# Patient Record
Sex: FEMALE | Race: WHITE | NOT HISPANIC OR LATINO | Employment: UNEMPLOYED | URBAN - METROPOLITAN AREA
[De-identification: names, ages, dates, MRNs, and addresses within clinical notes are randomized per-mention and may not be internally consistent; named-entity substitution may affect disease eponyms.]

---

## 2017-03-15 ENCOUNTER — OFFICE VISIT (OUTPATIENT)
Dept: URGENT CARE | Facility: PHYSICIAN GROUP | Age: 57
End: 2017-03-15
Payer: COMMERCIAL

## 2017-03-15 VITALS
HEIGHT: 68 IN | DIASTOLIC BLOOD PRESSURE: 80 MMHG | TEMPERATURE: 97.2 F | BODY MASS INDEX: 23.49 KG/M2 | HEART RATE: 97 BPM | OXYGEN SATURATION: 97 % | SYSTOLIC BLOOD PRESSURE: 138 MMHG | WEIGHT: 155 LBS

## 2017-03-15 DIAGNOSIS — J01.00 ACUTE NON-RECURRENT MAXILLARY SINUSITIS: ICD-10-CM

## 2017-03-15 PROCEDURE — 99214 OFFICE O/P EST MOD 30 MIN: CPT | Performed by: FAMILY MEDICINE

## 2017-03-15 RX ORDER — DOXYCYCLINE HYCLATE 100 MG
100 TABLET ORAL 2 TIMES DAILY
Qty: 20 TAB | Refills: 0 | Status: SHIPPED | OUTPATIENT
Start: 2017-03-15 | End: 2017-03-25

## 2017-03-15 ASSESSMENT — ENCOUNTER SYMPTOMS
SINUS PRESSURE: 1
COUGH: 0
HEADACHES: 1

## 2017-03-15 NOTE — PATIENT INSTRUCTIONS

## 2017-03-15 NOTE — PROGRESS NOTES
"Subjective:      Trang Moses is a 56 y.o. female who presents with Sinus Problem            Sinus Problem  This is a new problem. The current episode started 1 to 4 weeks ago. The problem has been gradually worsening since onset. The pain is moderate. Associated symptoms include congestion, headaches and sinus pressure. Pertinent negatives include no coughing.       Review of Systems   HENT: Positive for congestion and sinus pressure.    Respiratory: Negative for cough.    Neurological: Positive for headaches.     Allergies   Allergen Reactions   • Morphine Anaphylaxis   • Penicillins Rash     .         Objective:     /80 mmHg  Pulse 97  Temp(Src) 36.2 °C (97.2 °F)  Ht 1.727 m (5' 8\")  Wt 70.308 kg (155 lb)  BMI 23.57 kg/m2  SpO2 97%     Physical Exam   Constitutional: She is oriented to person, place, and time. She appears well-developed and well-nourished. No distress.   HENT:   Head: Normocephalic and atraumatic.   Nose: Mucosal edema and rhinorrhea present. Right sinus exhibits maxillary sinus tenderness. Left sinus exhibits maxillary sinus tenderness.   Eyes: Conjunctivae and EOM are normal. Pupils are equal, round, and reactive to light.   Cardiovascular: Normal rate, regular rhythm, normal heart sounds and intact distal pulses.    No murmur heard.  Pulmonary/Chest: Effort normal and breath sounds normal. No respiratory distress.   Abdominal: Soft. Bowel sounds are normal. She exhibits no distension. There is no tenderness.   Musculoskeletal: Normal range of motion.   Neurological: She is alert and oriented to person, place, and time. She has normal reflexes. No sensory deficit.   Skin: Skin is warm and dry.   Psychiatric: She has a normal mood and affect. Her behavior is normal.               Assessment/Plan:     1. Acute non-recurrent maxillary sinusitis  Differential diagnosis, natural history, supportive care, and indications for immediate follow-up discussed.   - doxycycline (VIBRAMYCIN) 100 " MG Tab; Take 1 Tab by mouth 2 times a day for 10 days.  Dispense: 20 Tab; Refill: 0

## 2017-04-14 ENCOUNTER — HOSPITAL ENCOUNTER (OUTPATIENT)
Dept: LAB | Facility: MEDICAL CENTER | Age: 57
End: 2017-04-14
Attending: FAMILY MEDICINE
Payer: COMMERCIAL

## 2017-04-14 DIAGNOSIS — E78.00 PURE HYPERCHOLESTEROLEMIA: ICD-10-CM

## 2017-04-14 DIAGNOSIS — I10 ESSENTIAL HYPERTENSION: ICD-10-CM

## 2017-04-14 LAB
25(OH)D3 SERPL-MCNC: 6 NG/ML (ref 30–100)
ALBUMIN SERPL BCP-MCNC: 4.3 G/DL (ref 3.2–4.9)
ALBUMIN/GLOB SERPL: 1.1 G/DL
ALP SERPL-CCNC: 75 U/L (ref 30–99)
ALT SERPL-CCNC: 85 U/L (ref 2–50)
ANION GAP SERPL CALC-SCNC: 7 MMOL/L (ref 0–11.9)
AST SERPL-CCNC: 55 U/L (ref 12–45)
BILIRUB SERPL-MCNC: 1.2 MG/DL (ref 0.1–1.5)
BUN SERPL-MCNC: 15 MG/DL (ref 8–22)
CALCIUM SERPL-MCNC: 9.8 MG/DL (ref 8.5–10.5)
CHLORIDE SERPL-SCNC: 104 MMOL/L (ref 96–112)
CHOLEST SERPL-MCNC: 290 MG/DL (ref 100–199)
CO2 SERPL-SCNC: 27 MMOL/L (ref 20–33)
CREAT SERPL-MCNC: 0.61 MG/DL (ref 0.5–1.4)
CREAT UR-MCNC: 240.5 MG/DL
GFR SERPL CREATININE-BSD FRML MDRD: >60 ML/MIN/1.73 M 2
GLOBULIN SER CALC-MCNC: 3.9 G/DL (ref 1.9–3.5)
GLUCOSE SERPL-MCNC: 88 MG/DL (ref 65–99)
HDLC SERPL-MCNC: 92 MG/DL
LDLC SERPL CALC-MCNC: 174 MG/DL
MICROALBUMIN UR-MCNC: 1.6 MG/DL
MICROALBUMIN/CREAT UR: 7 MG/G (ref 0–30)
POTASSIUM SERPL-SCNC: 3.9 MMOL/L (ref 3.6–5.5)
PROT SERPL-MCNC: 8.2 G/DL (ref 6–8.2)
SODIUM SERPL-SCNC: 138 MMOL/L (ref 135–145)
TRIGL SERPL-MCNC: 121 MG/DL (ref 0–149)

## 2017-04-14 PROCEDURE — 36415 COLL VENOUS BLD VENIPUNCTURE: CPT

## 2017-04-14 PROCEDURE — 80053 COMPREHEN METABOLIC PANEL: CPT

## 2017-04-14 PROCEDURE — 80061 LIPID PANEL: CPT

## 2017-04-14 PROCEDURE — 82043 UR ALBUMIN QUANTITATIVE: CPT

## 2017-04-14 PROCEDURE — 82306 VITAMIN D 25 HYDROXY: CPT

## 2017-04-14 PROCEDURE — 82570 ASSAY OF URINE CREATININE: CPT

## 2017-04-21 ENCOUNTER — OFFICE VISIT (OUTPATIENT)
Dept: MEDICAL GROUP | Facility: PHYSICIAN GROUP | Age: 57
End: 2017-04-21
Payer: COMMERCIAL

## 2017-04-21 VITALS
SYSTOLIC BLOOD PRESSURE: 132 MMHG | HEIGHT: 68 IN | DIASTOLIC BLOOD PRESSURE: 90 MMHG | HEART RATE: 64 BPM | WEIGHT: 155 LBS | BODY MASS INDEX: 23.49 KG/M2 | TEMPERATURE: 97.5 F | OXYGEN SATURATION: 98 %

## 2017-04-21 DIAGNOSIS — R79.89 LOW SERUM VITAMIN D: ICD-10-CM

## 2017-04-21 DIAGNOSIS — E78.00 PURE HYPERCHOLESTEROLEMIA: ICD-10-CM

## 2017-04-21 DIAGNOSIS — I10 ESSENTIAL HYPERTENSION: ICD-10-CM

## 2017-04-21 PROCEDURE — 99214 OFFICE O/P EST MOD 30 MIN: CPT | Performed by: FAMILY MEDICINE

## 2017-04-21 NOTE — PROGRESS NOTES
"Subjective:   Trang Moses is a 56 y.o. female here today for HTN; lipids; lab review    Low serum vitamin D  Ongoing issue. Patient had been taking supplemental vitamin D over-the-counter. Recent blood work shows that her vitamin D level has continued to decrease down to 6.    Hyperlipidemia  Ongoing issue. Patient reports that she is working diligently to choose healthy lifestyle including healthy eating and regular daily exercise. Chart review shows that her total cholesterol and LDL cholesterol has not changed since last checked but have improved significantly in the last year.    Essential hypertension  Ongoing issue. Patient reports 100% compliance with lisinopril 20 mg daily. Patient denies any cough, headache, dizziness, chest pain. Chart review shows a blood pressure heart rate both are in the normal range. I reviewed recent blood work shows normal kidney function.         Current medicines (including changes today)  Current Outpatient Prescriptions   Medication Sig Dispense Refill   • Cholecalciferol 88236 UNIT Cap Take 1 Cap by mouth every 7 days. 12 Cap 3   • lisinopril (PRINIVIL) 20 MG Tab TAKE 1 TAB BY MOUTH EVERY DAY. 30 Tab 11   • lisinopril (PRINIVIL) 10 MG Tab Take 1.5 Tabs by mouth every day. 45 Tab 5   • aspirin (ASA) 325 MG Tab Take 325 mg by mouth every 6 hours as needed.     • therapeutic multivitamin-minerals (THERAGRAN-M) Tab Take 1 Tab by mouth every day.       No current facility-administered medications for this visit.     She  has a past medical history of MS (multiple sclerosis) (CMS-[facility]) and Hypertension.    ROS   No chest pain, no shortness of breath, no abdominal pain       Objective:     Blood pressure 132/90, pulse 64, temperature 36.4 °C (97.5 °F), height 1.727 m (5' 8\"), weight 70.308 kg (155 lb), SpO2 98 %. Body mass index is 23.57 kg/(m^2).   Physical Exam:  Alert, oriented in no acute distress.  Eye contact is good, speech goal directed, affect calm  HEENT: conjunctiva " non-injected, sclera non-icteric.  Pinna normal. TM pearly gray.   Oral mucous membranes pink and moist with no lesions.  Neck No adenopathy or masses in the neck or supraclavicular regions.  Lungs: clear to auscultation bilaterally with good excursion.  CV: regular rate and rhythm.  Abdomen: soft, nontender, No CVAT  Ext: no edema, color normal, vascularity normal, temperature normal  Neuro: CN 2-12 grossly intact  Psych: normal affect and mood      Assessment and Plan:   The following treatment plan was discussed     1. Pure hypercholesterolemia  LIPID PROFILE    Improved. Continue healthy lifestyle; recheck labs in one year   2. Low serum vitamin D  VITAMIN D,25 HYDROXY    Uncontrolled. Prescription strength vitamin D 50,000 international units once a week; recheck labs in one year   3. Essential hypertension  COMP METABOLIC PANEL    Stable. Continue current medications; recheck labs in one year       Followup: Return in about 1 year (around 4/21/2018) for HTN/lipids/labs, Short.

## 2017-04-21 NOTE — ASSESSMENT & PLAN NOTE
Ongoing issue. Patient reports that she is working diligently to choose healthy lifestyle including healthy eating and regular daily exercise. Chart review shows that her total cholesterol and LDL cholesterol has not changed since last checked but have improved significantly in the last year.

## 2017-04-21 NOTE — ASSESSMENT & PLAN NOTE
Ongoing issue. Patient reports 100% compliance with lisinopril 20 mg daily. Patient denies any cough, headache, dizziness, chest pain. Chart review shows a blood pressure heart rate both are in the normal range. I reviewed recent blood work shows normal kidney function.

## 2017-04-21 NOTE — MR AVS SNAPSHOT
"Trang Moses   2017 10:40 AM   Office Visit   MRN: [id number]    Department:  [facility]   Dept Phone:  [phone]    Description:  Female : 1960   Provider:  Alyse Lara D.O.           Reason for Visit     Follow-Up htn, labs      Allergies as of 2017     Allergen Noted Reactions    Doxycycline 2017   Rash    recent    Morphine 2016   Anaphylaxis    Penicillins 2016   Rash    .      You were diagnosed with     Pure hypercholesterolemia   [272.0.ICD-9-CM]       Low serum vitamin D   [8220927]       Essential hypertension   [3807587]         Vital Signs     Blood Pressure Pulse Temperature Height Weight Body Mass Index    132/90 mmHg 64 36.4 °C (97.5 °F) 1.727 m (5' 8\") 70.308 kg (155 lb) 23.57 kg/m2    Oxygen Saturation Smoking Status                98% Never Smoker           Basic Information     Date Of Birth Sex Race Ethnicity Preferred Language    1960 Female White Non- English      Your appointments     May 16, 2017  1:20 PM   MA SCRN10 with RB MG 2   [facility] ([address])    [address] [place] [address] [address]  [facility] [id number] [phone]           No deodorant, powder, perfume or lotion under the arm or breast area.            2018 10:40 AM   Established Patient with Alyse Lara D.O.   [facility] ([address] [id number] [phone]           You will be receiving a confirmation call a few days before your appointment from our automated call confirmation system.              Problem List              ICD-10-CM Priority Class Noted - Resolved    MS (multiple sclerosis) (CMS-HCC) G35   3/31/2016 - Present    Essential hypertension I10   3/31/2016 - Present    Hyperlipidemia E78.5   2016 - Present    Low serum vitamin D R79.89   2017 - Present      Health Maintenance        Date Due Completion Dates    IMM DTaP/Tdap/Td Vaccine (1 - Tdap) 1980 ---   "    PAP SMEAR 8/31/1982 ---    MAMMOGRAM 4/26/2017 4/26/2016    COLONOSCOPY 3/10/2021 3/10/2011 (Done)    Override on 3/10/2011: Done            Current Immunizations     Influenza Vaccine Quad Inj (Preserved) 10/21/2016      Below and/or attached are the medications your provider expects you to take. Review all of your home medications and newly ordered medications with your provider and/or pharmacist. Follow medication instructions as directed by your provider and/or pharmacist. Please keep your medication list with you and share with your provider. Update the information when medications are discontinued, doses are changed, or new medications (including over-the-counter products) are added; and carry medication information at all times in the event of emergency situations     Allergies:  DOXYCYCLINE - Rash     MORPHINE - Anaphylaxis     PENICILLINS - Rash               Medications  Valid as of: April 21, 2017 - 10:54 AM    Generic Name Brand Name Tablet Size Instructions for use    Aspirin (Tab)  MG Take 325 mg by mouth every 6 hours as needed.        Cholecalciferol (Cap) Cholecalciferol 69778 UNIT Take 1 Cap by mouth every 7 days.        Lisinopril (Tab) PRINIVIL 10 MG Take 1.5 Tabs by mouth every day.        Lisinopril (Tab) PRINIVIL 20 MG TAKE 1 TAB BY MOUTH EVERY DAY.        Multiple Vitamins-Minerals (Tab) THERAGRAN-M  Take 1 Tab by mouth every day.        .                 Medicines prescribed today were sent to:     [facility]/PHARMACY #[id number] [facility] [place] - [address] [address]    Phone: [phone] Fax: [phone]    Open 24 Hours?: No      Medication refill instructions:       If your prescription bottle indicates you have medication refills left, it is not necessary to call your provider’s office. Please contact your pharmacy and they will refill your medication.    If your prescription bottle indicates you do not have any refills left, you may request refills at any time  through one of the following ways: The online [employer] system (except Urgent Care), by calling your provider’s office, or by asking your pharmacy to contact your provider’s office with a refill request. Medication refills are processed only during regular business hours and may not be available until the next business day. Your provider may request additional information or to have a follow-up visit with you prior to refilling your medication.   *Please Note: Medication refills are assigned a new Rx number when refilled electronically. Your pharmacy may indicate that no refills were authorized even though a new prescription for the same medication is available at the pharmacy. Please request the medicine by name with the pharmacy before contacting your provider for a refill.        Your To Do List     Future Labs/Procedures Complete By Expires    COMP METABOLIC PANEL  12/1/2017 4/22/2018    LIPID PROFILE  12/1/2017 4/22/2018    VITAMIN D,25 HYDROXY  12/1/2017 4/22/2018         [employer] Access Code: Activation code not generated  Current [employer] Status: Active

## 2017-04-21 NOTE — ASSESSMENT & PLAN NOTE
Ongoing issue. Patient had been taking supplemental vitamin D over-the-counter. Recent blood work shows that her vitamin D level has continued to decrease down to 6.

## 2017-05-16 ENCOUNTER — HOSPITAL ENCOUNTER (OUTPATIENT)
Dept: RADIOLOGY | Facility: MEDICAL CENTER | Age: 57
End: 2017-05-16
Attending: FAMILY MEDICINE
Payer: COMMERCIAL

## 2017-05-16 DIAGNOSIS — Z12.31 VISIT FOR SCREENING MAMMOGRAM: ICD-10-CM

## 2017-05-16 PROCEDURE — 77063 BREAST TOMOSYNTHESIS BI: CPT

## 2017-07-18 ENCOUNTER — OFFICE VISIT (OUTPATIENT)
Dept: URGENT CARE | Facility: PHYSICIAN GROUP | Age: 57
End: 2017-07-18
Payer: COMMERCIAL

## 2017-07-18 ENCOUNTER — HOSPITAL ENCOUNTER (OUTPATIENT)
Dept: RADIOLOGY | Facility: MEDICAL CENTER | Age: 57
End: 2017-07-18
Attending: NURSE PRACTITIONER
Payer: COMMERCIAL

## 2017-07-18 VITALS
BODY MASS INDEX: 24.25 KG/M2 | DIASTOLIC BLOOD PRESSURE: 84 MMHG | TEMPERATURE: 98.4 F | WEIGHT: 160 LBS | SYSTOLIC BLOOD PRESSURE: 122 MMHG | HEIGHT: 68 IN | HEART RATE: 81 BPM | OXYGEN SATURATION: 96 %

## 2017-07-18 DIAGNOSIS — M79.644 FINGER PAIN, RIGHT: ICD-10-CM

## 2017-07-18 DIAGNOSIS — S62.656A CLOSED NONDISPLACED FRACTURE OF MIDDLE PHALANX OF RIGHT LITTLE FINGER, INITIAL ENCOUNTER: ICD-10-CM

## 2017-07-18 PROCEDURE — 73140 X-RAY EXAM OF FINGER(S): CPT | Mod: RT

## 2017-07-18 PROCEDURE — 99214 OFFICE O/P EST MOD 30 MIN: CPT | Performed by: NURSE PRACTITIONER

## 2017-07-18 RX ORDER — HYDROCODONE BITARTRATE AND ACETAMINOPHEN 5; 325 MG/1; MG/1
1 TABLET ORAL EVERY 4 HOURS PRN
Qty: 15 TAB | Refills: 0 | Status: SHIPPED | OUTPATIENT
Start: 2017-07-18 | End: 2018-05-25

## 2017-07-18 NOTE — MR AVS SNAPSHOT
"Trang Moses   2017 1:05 PM   Office Visit   MRN: [id number]    Department:  [facility]   Dept Phone:  [phone]    Description:  Female : 1960   Provider:  DONOVAN Clemons           Reason for Visit     Finger Injury right hand 5th finger injury       Allergies as of 2017     Allergen Noted Reactions    Doxycycline 2017   Rash    recent    Morphine 2016   Anaphylaxis    Penicillins 2016   Rash    .      You were diagnosed with     Finger pain, right   [[id number]]         Vital Signs     Blood Pressure Pulse Temperature Height Weight Body Mass Index    122/84 mmHg 81 36.9 °C (98.4 °F) 1.727 m (5' 8\") 72.576 kg (160 lb) 24.33 kg/m2    Oxygen Saturation Smoking Status                96% Never Smoker           Basic Information     Date Of Birth Sex Race Ethnicity Preferred Language    1960 Female White Non- English      Your appointments     2018 10:40 AM   Established Patient with Alyse Lara D.O.   [facility] ([place])    [address] [place] [id number] [phone]           You will be receiving a confirmation call a few days before your appointment from our automated call confirmation system.              Problem List              ICD-10-CM Priority Class Noted - Resolved    MS (multiple sclerosis) (CMS-HCC) G35   3/31/2016 - Present    Essential hypertension I10   3/31/2016 - Present    Hyperlipidemia E78.5   2016 - Present    Low serum vitamin D R79.89   2017 - Present      Health Maintenance        Date Due Completion Dates    IMM DTaP/Tdap/Td Vaccine (1 - Tdap) 1980 ---    PAP SMEAR 1982 ---    IMM INFLUENZA (1) 2017 10/21/2016    MAMMOGRAM 2018, 2016    COLONOSCOPY 3/10/2021 3/10/2011 (Done)    Override on 3/10/2011: Done            Current Immunizations     Influenza Vaccine Quad Inj (Preserved) 10/21/2016      Below and/or attached are the medications your " provider expects you to take. Review all of your home medications and newly ordered medications with your provider and/or pharmacist. Follow medication instructions as directed by your provider and/or pharmacist. Please keep your medication list with you and share with your provider. Update the information when medications are discontinued, doses are changed, or new medications (including over-the-counter products) are added; and carry medication information at all times in the event of emergency situations     Allergies:  DOXYCYCLINE - Rash     MORPHINE - Anaphylaxis     PENICILLINS - Rash               Medications  Valid as of: July 18, 2017 -  2:17 PM    Generic Name Brand Name Tablet Size Instructions for use    Aspirin (Tab)  MG Take 325 mg by mouth every 6 hours as needed.        Cholecalciferol (Cap) Cholecalciferol 97482 UNIT Take 1 Cap by mouth every 7 days.        Lisinopril (Tab) PRINIVIL 10 MG Take 1.5 Tabs by mouth every day.        Lisinopril (Tab) PRINIVIL 20 MG TAKE 1 TAB BY MOUTH EVERY DAY.        Multiple Vitamins-Minerals (Tab) THERAGRAN-M  Take 1 Tab by mouth every day.        .                 Medicines prescribed today were sent to:     [facility]/PHARMACY #[id number] - [place], [place] - [address] [id number] [facility] [address]    Phone: [phone] Fax: [phone]    Open 24 Hours?: No      Medication refill instructions:       If your prescription bottle indicates you have medication refills left, it is not necessary to call your provider’s office. Please contact your pharmacy and they will refill your medication.    If your prescription bottle indicates you do not have any refills left, you may request refills at any time through one of the following ways: The online [employer] system (except Urgent Care), by calling your provider’s office, or by asking your pharmacy to contact your provider’s office with a refill request. Medication refills are processed only during regular business hours  and may not be available until the next business day. Your provider may request additional information or to have a follow-up visit with you prior to refilling your medication.   *Please Note: Medication refills are assigned a new Rx number when refilled electronically. Your pharmacy may indicate that no refills were authorized even though a new prescription for the same medication is available at the pharmacy. Please request the medicine by name with the pharmacy before contacting your provider for a refill.        Your To Do List     Future Labs/Procedures Complete By Expires    DX-FINGER(S) 2+ RIGHT  As directed 7/18/2018         [employer] Access Code: Activation code not generated  Current [employer] Status: Active

## 2017-07-18 NOTE — PROGRESS NOTES
"Subjective:      Trang Moses is a 56 y.o. female who presents with Finger Injury            Hand Injury  This is a new problem. Episode onset: A screen door slammed into her right hand, little finger about 6 days ago. She also has a subsequent healing cut to the same finger but she noticed last night the pain in her finger was becoming worse. She reports on and off swelling since injury. The problem occurs constantly. The problem has been gradually worsening. Associated symptoms comments: Reports that at the end of the day her finger is more swollen from using it. It throbs at night before she goes to bed. She denies any redness or drainage from the finger. Exacerbated by: using right hand doing daily activities. She has tried NSAIDs and ice for the symptoms. The treatment provided no relief.       Review of Systems   Musculoskeletal: Positive for joint pain (right little finger).   All other systems reviewed and are negative.    Past Medical History   Diagnosis Date   • MS (multiple sclerosis) (CMS-HCC)    • Hypertension       Past Surgical History   Procedure Laterality Date   • Cholecystectomy     • Breast reconstruction        Social History     Social History   • Marital Status:      Spouse Name: N/A   • Number of Children: N/A   • Years of Education: N/A     Occupational History   • Not on file.     Social History Main Topics   • Smoking status: Never Smoker    • Smokeless tobacco: Never Used   • Alcohol Use: 0.0 oz/week     0 Standard drinks or equivalent per week      Comment: once a week   • Drug Use: No   • Sexual Activity: Yes     Other Topics Concern   • Not on file     Social History Narrative          Objective:     /84 mmHg  Pulse 81  Temp(Src) 36.9 °C (98.4 °F)  Ht 1.727 m (5' 8\")  Wt 72.576 kg (160 lb)  BMI 24.33 kg/m2  SpO2 96%     Physical Exam   Constitutional: She is oriented to person, place, and time. Vital signs are normal. She appears well-developed and well-nourished. "   HENT:   Head: Normocephalic and atraumatic.   Eyes: EOM are normal. Pupils are equal, round, and reactive to light.   Neck: Normal range of motion.   Cardiovascular: Normal rate and regular rhythm.    Pulmonary/Chest: Effort normal.   Musculoskeletal: Normal range of motion.        Right hand: She exhibits tenderness and swelling.        Hands:  Neurological: She is alert and oriented to person, place, and time.   Skin: Skin is warm and dry.   Psychiatric: She has a normal mood and affect. Her speech is normal and behavior is normal. Thought content normal.   Vitals reviewed.            7/18/2017 1:53 PM    HISTORY/REASON FOR EXAM:  Pain/Deformity Following Trauma.  .    TECHNIQUE/EXAM DESCRIPTION AND NUMBER OF VIEWS:  3 views of the RIGHT fingers.    COMPARISON: None    FINDINGS:  The alignment and mineralization are normal. No focal areas of soft tissue swelling or fracture lines are appreciated. No significant arthritic changes are identified.         Impression        No fracture or dislocation appreciated.     After review of the Xray by my self and Dr. Arjun Santillan, we concluded there appears to be a greenstick fracture on the distal portion of the middle phalanx right, fifth digit. Spoke with Dr. Simmons regarding read and she states she does not appreciate a fracture. Given the HPI and physical exam findings, I will treat the patient as a fracture. Discussed this with the patient and she V/U     Assessment/Plan:     1. Finger pain, right  - DX-FINGER(S) 2+ RIGHT; Future    2. Closed nondisplaced fracture of middle phalanx of right little finger, initial encounter  - hydrocodone-acetaminophen (NORCO) 5-325 MG Tab per tablet; Take 1 Tab by mouth every four hours as needed.  Dispense: 15 Tab; Refill: 0    Long finger splint applied and wrapped with coban. Advised pt to keep splint on for about 4 weeks, may remove to shower  Tylenol for breakthrough pain  RICE  Use Ibuprofen infrequently for pain  control  Supportive care, differential diagnoses, and indications for immediate follow-up discussed with patient.    Pathogenesis of diagnosis discussed including typical length and natural progression.      Instructed to return to  or nearest emergency department if symptoms fail to improve, for any change in condition, further concerns, or new concerning symptoms.  Patient states understanding of the plan of care and discharge instructions.

## 2017-11-08 RX ORDER — LISINOPRIL 20 MG/1
20 TABLET ORAL
Qty: 90 TAB | Refills: 3 | Status: SHIPPED | OUTPATIENT
Start: 2017-11-08 | End: 2018-11-06 | Stop reason: SDUPTHER

## 2018-02-26 NOTE — TELEPHONE ENCOUNTER
Was the patient seen in the last year in this department? Yes LOV 04/21/17 LAB Ordered not complete Next Reg[facility]. 04/27/18    Does patient have an active prescription for medications requested? No     Received Request Via: Pharmacy

## 2018-02-27 RX ORDER — CHOLECALCIFEROL (VITAMIN D3) 1250 MCG
50000 CAPSULE ORAL
Qty: 12 CAP | Refills: 0 | Status: SHIPPED | OUTPATIENT
Start: 2018-02-27 | End: 2018-05-20 | Stop reason: SDUPTHER

## 2018-05-17 ENCOUNTER — TELEPHONE (OUTPATIENT)
Dept: MEDICAL GROUP | Facility: PHYSICIAN GROUP | Age: 58
End: 2018-05-17

## 2018-05-17 ENCOUNTER — HOSPITAL ENCOUNTER (OUTPATIENT)
Dept: LAB | Facility: MEDICAL CENTER | Age: 58
End: 2018-05-17
Attending: FAMILY MEDICINE
Payer: COMMERCIAL

## 2018-05-17 DIAGNOSIS — I10 ESSENTIAL HYPERTENSION: ICD-10-CM

## 2018-05-17 DIAGNOSIS — E78.00 PURE HYPERCHOLESTEROLEMIA: ICD-10-CM

## 2018-05-17 DIAGNOSIS — R79.89 LOW SERUM VITAMIN D: ICD-10-CM

## 2018-05-17 LAB
25(OH)D3 SERPL-MCNC: 65 NG/ML (ref 30–100)
ALBUMIN SERPL BCP-MCNC: 4.6 G/DL (ref 3.2–4.9)
ALBUMIN/GLOB SERPL: 1.3 G/DL
ALP SERPL-CCNC: 62 U/L (ref 30–99)
ALT SERPL-CCNC: 188 U/L (ref 2–50)
ANION GAP SERPL CALC-SCNC: 11 MMOL/L (ref 0–11.9)
AST SERPL-CCNC: 140 U/L (ref 12–45)
BILIRUB SERPL-MCNC: 1.5 MG/DL (ref 0.1–1.5)
BUN SERPL-MCNC: 17 MG/DL (ref 8–22)
CALCIUM SERPL-MCNC: 9.9 MG/DL (ref 8.5–10.5)
CHLORIDE SERPL-SCNC: 102 MMOL/L (ref 96–112)
CHOLEST SERPL-MCNC: 289 MG/DL (ref 100–199)
CO2 SERPL-SCNC: 26 MMOL/L (ref 20–33)
CREAT SERPL-MCNC: 0.66 MG/DL (ref 0.5–1.4)
CREAT UR-MCNC: 462.3 MG/DL
GLOBULIN SER CALC-MCNC: 3.5 G/DL (ref 1.9–3.5)
GLUCOSE SERPL-MCNC: 69 MG/DL (ref 65–99)
HDLC SERPL-MCNC: 104 MG/DL
LDLC SERPL CALC-MCNC: 155 MG/DL
MICROALBUMIN UR-MCNC: 3 MG/DL
MICROALBUMIN/CREAT UR: 6 MG/G (ref 0–30)
POTASSIUM SERPL-SCNC: 4.2 MMOL/L (ref 3.6–5.5)
PROT SERPL-MCNC: 8.1 G/DL (ref 6–8.2)
SODIUM SERPL-SCNC: 139 MMOL/L (ref 135–145)
TRIGL SERPL-MCNC: 152 MG/DL (ref 0–149)

## 2018-05-17 PROCEDURE — 80053 COMPREHEN METABOLIC PANEL: CPT

## 2018-05-17 PROCEDURE — 82306 VITAMIN D 25 HYDROXY: CPT

## 2018-05-17 PROCEDURE — 80061 LIPID PANEL: CPT

## 2018-05-17 PROCEDURE — 36415 COLL VENOUS BLD VENIPUNCTURE: CPT

## 2018-05-17 PROCEDURE — 82043 UR ALBUMIN QUANTITATIVE: CPT

## 2018-05-17 PROCEDURE — 82570 ASSAY OF URINE CREATININE: CPT

## 2018-05-17 NOTE — TELEPHONE ENCOUNTER
Can you please order labs prior to her next visit with us. She came into the lab today with  orders. I told the  to just draw the blood and I would give her the order later.

## 2018-05-18 ENCOUNTER — HOSPITAL ENCOUNTER (OUTPATIENT)
Dept: RADIOLOGY | Facility: MEDICAL CENTER | Age: 58
End: 2018-05-18
Attending: FAMILY MEDICINE
Payer: COMMERCIAL

## 2018-05-18 DIAGNOSIS — Z12.31 VISIT FOR SCREENING MAMMOGRAM: ICD-10-CM

## 2018-05-18 PROCEDURE — 77063 BREAST TOMOSYNTHESIS BI: CPT

## 2018-05-21 RX ORDER — CHOLECALCIFEROL (VITAMIN D3) 1250 MCG
CAPSULE ORAL
Qty: 12 CAP | Refills: 2 | Status: SHIPPED | OUTPATIENT
Start: 2018-05-21 | End: 2019-02-11 | Stop reason: SDUPTHER

## 2018-05-25 ENCOUNTER — HOSPITAL ENCOUNTER (OUTPATIENT)
Dept: LAB | Facility: MEDICAL CENTER | Age: 58
End: 2018-05-25
Attending: FAMILY MEDICINE
Payer: COMMERCIAL

## 2018-05-25 ENCOUNTER — OFFICE VISIT (OUTPATIENT)
Dept: MEDICAL GROUP | Facility: PHYSICIAN GROUP | Age: 58
End: 2018-05-25
Payer: COMMERCIAL

## 2018-05-25 VITALS
BODY MASS INDEX: 25.16 KG/M2 | WEIGHT: 166 LBS | SYSTOLIC BLOOD PRESSURE: 118 MMHG | TEMPERATURE: 97.5 F | HEART RATE: 66 BPM | OXYGEN SATURATION: 91 % | HEIGHT: 68 IN | DIASTOLIC BLOOD PRESSURE: 64 MMHG

## 2018-05-25 DIAGNOSIS — R79.89 ELEVATED LFTS: ICD-10-CM

## 2018-05-25 DIAGNOSIS — Z11.59 NEED FOR HEPATITIS C SCREENING TEST: ICD-10-CM

## 2018-05-25 DIAGNOSIS — I10 ESSENTIAL HYPERTENSION: ICD-10-CM

## 2018-05-25 DIAGNOSIS — R79.89 LOW SERUM VITAMIN D: ICD-10-CM

## 2018-05-25 DIAGNOSIS — E78.00 PURE HYPERCHOLESTEROLEMIA: ICD-10-CM

## 2018-05-25 PROCEDURE — 36415 COLL VENOUS BLD VENIPUNCTURE: CPT

## 2018-05-25 PROCEDURE — 99214 OFFICE O/P EST MOD 30 MIN: CPT | Performed by: FAMILY MEDICINE

## 2018-05-25 PROCEDURE — 80074 ACUTE HEPATITIS PANEL: CPT

## 2018-05-25 ASSESSMENT — PATIENT HEALTH QUESTIONNAIRE - PHQ9: CLINICAL INTERPRETATION OF PHQ2 SCORE: 0

## 2018-05-25 NOTE — PROGRESS NOTES
Subjective:   Trang Moses is a 57 y.o. female here today for HTN, elevated lipids, low vit d, elevated LFTs    Essential hypertension  Ongoing issue; patient currently compliant with lisinopril 20 mg daily; currently denies any headache, dizziness, chest pain, cough; current blood pressure and heart rate both the recommended range.    Hyperlipidemia  Ongoing issue; patient reports that she has made some significant lifestyle changes. Recent labs of interview in detail with the patient today that shows her total cholesterol, LDL cholesterol, triglycerides are all elevated. Unfortunately she is also having elevations in her liver enzymes and this may be complicating the picture.    Low serum vitamin D  Ongoing issues; patient recently has increased to taking therapeutic vitamin D 50,000 international units once a week. Recent labs and intervene in detail with the patient today which shows her vitamin D level has improved to recommended range at 63    Elevated LFTs  This problem is new to me. Recent labs of intervening detail the patient today which shows that her AST and ALT are both approximately 3 times above the recommended range. Review of her chart shows that she has had some mild elevations in the past but only within a few points of the recommended range. Patient currently denies that she is made any changes in her supplements; has not had an excessive alcohol intake.         Current medicines (including changes today)  Current Outpatient Prescriptions   Medication Sig Dispense Refill   • Cholecalciferol (VITAMIN D3) 86062 units Cap TAKE 1 CAPSULE BY MOUTH EVERY 7 DAYS. 12 Cap 2   • lisinopril (PRINIVIL) 20 MG Tab Take 1 Tab by mouth every day. TAKE 1 TAB BY MOUTH EVERY DAY. 90 Tab 3   • aspirin (ASA) 325 MG Tab Take 325 mg by mouth every 6 hours as needed.     • therapeutic multivitamin-minerals (THERAGRAN-M) Tab Take 1 Tab by mouth every day.       No current facility-administered medications for this visit.   "    She  has a past medical history of Hypertension and MS (multiple sclerosis) (HCC).    ROS   No chest pain, no shortness of breath, no abdominal pain       Objective:     Blood pressure 118/64, pulse 66, temperature 36.4 °C (97.5 °F), height 1.727 m (5' 8\"), weight 75.3 kg (166 lb), SpO2 91 %. Body mass index is 25.24 kg/m².   Physical Exam:  Alert, oriented in no acute distress.  Eye contact is good, speech goal directed, affect calm  HEENT: conjunctiva non-injected, sclera non-icteric.  Pinna normal. TM pearly gray.   Oral mucous membranes pink and moist with no lesions.  Neck No adenopathy or masses in the neck or supraclavicular regions.  Lungs: clear to auscultation bilaterally with good excursion.  CV: regular rate and rhythm.  Abdomen: soft, nontender, No CVAT  Ext: no edema, color normal, vascularity normal, temperature normal        Assessment and Plan:   The following treatment plan was discussed     1. Low serum vitamin D      Improved. Continue current supplementation; monitor   2. Pure hypercholesterolemia      Uncontrolled; labs complicated by increase in liver function tests; reevaluate in the next 6 months   3. Elevated LFTs  HEPATITIS PANEL ACUTE(4 COMPONENTS)    US-LIVER AND BILIARY TREE    Uncontrolled; unknown origin. Differential diagnosis includes hepatitis infection, cancer, idiopathic. Labs along with ultrasound ordered for evaluation   4. Essential hypertension      Stable. Continue current medication; monitor   5. Need for hepatitis C screening test  HEP C VIRUS ANTIBODY    Chest ordered for further evaluation given significant elevation in LFTs. Patient also within recommended age range       Followup: Return in about 3 months (around 8/25/2018) for Pap/Dimas craig.            "

## 2018-05-25 NOTE — ASSESSMENT & PLAN NOTE
Ongoing issues; patient recently has increased to taking therapeutic vitamin D 50,000 international units once a week. Recent labs and intervene in detail with the patient today which shows her vitamin D level has improved to recommended range at 63

## 2018-05-25 NOTE — ASSESSMENT & PLAN NOTE
Ongoing issue; patient reports that she has made some significant lifestyle changes. Recent labs of interview in detail with the patient today that shows her total cholesterol, LDL cholesterol, triglycerides are all elevated. Unfortunately she is also having elevations in her liver enzymes and this may be complicating the picture.

## 2018-05-25 NOTE — ASSESSMENT & PLAN NOTE
This problem is new to me. Recent labs of intervening detail the patient today which shows that her AST and ALT are both approximately 3 times above the recommended range. Review of her chart shows that she has had some mild elevations in the past but only within a few points of the recommended range. Patient currently denies that she is made any changes in her supplements; has not had an excessive alcohol intake.

## 2018-05-25 NOTE — ASSESSMENT & PLAN NOTE
Ongoing issue; patient currently compliant with lisinopril 20 mg daily; currently denies any headache, dizziness, chest pain, cough; current blood pressure and heart rate both the recommended range.

## 2018-05-30 ENCOUNTER — TELEPHONE (OUTPATIENT)
Dept: MEDICAL GROUP | Facility: PHYSICIAN GROUP | Age: 58
End: 2018-05-30

## 2018-05-30 NOTE — TELEPHONE ENCOUNTER
Is there any way you can give Trang a call at your convenience. She is very concerned about her Hep A result.

## 2018-05-30 NOTE — TELEPHONE ENCOUNTER
Pt called and explained current labs and all questions answered to the best of my ability.    Alyse aLra D.O.

## 2018-06-06 ENCOUNTER — HOSPITAL ENCOUNTER (OUTPATIENT)
Dept: RADIOLOGY | Facility: MEDICAL CENTER | Age: 58
End: 2018-06-06
Attending: FAMILY MEDICINE
Payer: COMMERCIAL

## 2018-06-06 DIAGNOSIS — R79.89 ELEVATED LFTS: ICD-10-CM

## 2018-06-06 PROCEDURE — 76705 ECHO EXAM OF ABDOMEN: CPT

## 2018-09-11 ENCOUNTER — OFFICE VISIT (OUTPATIENT)
Dept: MEDICAL GROUP | Facility: PHYSICIAN GROUP | Age: 58
End: 2018-09-11
Payer: COMMERCIAL

## 2018-09-11 ENCOUNTER — HOSPITAL ENCOUNTER (OUTPATIENT)
Facility: MEDICAL CENTER | Age: 58
End: 2018-09-11
Attending: FAMILY MEDICINE
Payer: COMMERCIAL

## 2018-09-11 VITALS
SYSTOLIC BLOOD PRESSURE: 118 MMHG | TEMPERATURE: 97.7 F | BODY MASS INDEX: 25.16 KG/M2 | WEIGHT: 166 LBS | DIASTOLIC BLOOD PRESSURE: 74 MMHG | HEIGHT: 68 IN | OXYGEN SATURATION: 96 % | HEART RATE: 72 BPM

## 2018-09-11 DIAGNOSIS — Z01.419 WELL WOMAN EXAM WITH ROUTINE GYNECOLOGICAL EXAM: ICD-10-CM

## 2018-09-11 PROCEDURE — 88175 CYTOPATH C/V AUTO FLUID REDO: CPT

## 2018-09-11 PROCEDURE — 87624 HPV HI-RISK TYP POOLED RSLT: CPT

## 2018-09-11 PROCEDURE — 99396 PREV VISIT EST AGE 40-64: CPT | Performed by: FAMILY MEDICINE

## 2018-09-11 NOTE — PROGRESS NOTES
SUBJECTIVE: 58 y.o. female for annual routine gynecologic exam  Chief Complaint   Patient presents with   • Gynecologic Exam       Obstetric History       T0      L0     SAB0   TAB0   Ectopic0   Molar0   Multiple0   Live Births0       Last Pap:   History   Sexual Activity   • Sexual activity: Yes     Sexual history: currently sexually active, single partner   H/O Abnormal Pap no  Previous HIV testing? no  She  reports that she has never smoked. She has never used smokeless tobacco.        Allergies: Doxycycline; Morphine; and Penicillins     ROS:    Reports no menopause symptoms of hot flashes, night sweats, sleep disruption, mood changes.Denies vaginal dryness.   No significant bloating/fluid retention, pelvic pain, or dyspareunia. No vaginal discharge   No breast tenderness, mass, nipple discharge, changes in size or contour, or abnormal cyclic discomfort.  No urinary tract symptoms, no incontinence.   No abdominal pain, change in bowel habits, black or bloody stools.    No unusual headaches, no visual changes, menstrual migraines   No prolonged cough. No dyspnea or chest pain on exertion.  No depression, labile mood, anxiety, libido changes, insomnia.  No polydipsia, polyuria, temperature intolerance.  No new/concerning skin lesions, concerns. No chest pain, no short of breath, no abdominal pain    Exercise: moderate regular exercise program  Preventive Care:mammogram    Current medicines (including changes today)  Current Outpatient Prescriptions   Medication Sig Dispense Refill   • Cholecalciferol (VITAMIN D3) 16572 units Cap TAKE 1 CAPSULE BY MOUTH EVERY 7 DAYS. 12 Cap 2   • lisinopril (PRINIVIL) 20 MG Tab Take 1 Tab by mouth every day. TAKE 1 TAB BY MOUTH EVERY DAY. 90 Tab 3   • aspirin (ASA) 325 MG Tab Take 325 mg by mouth every 6 hours as needed.     • therapeutic multivitamin-minerals (THERAGRAN-M) Tab Take 1 Tab by mouth every day.       No current facility-administered medications for  "this visit.      She  has a past medical history of Hypertension and MS (multiple sclerosis) (HCC).  She  has a past surgical history that includes cholecystectomy and breast reconstruction.     Family History:   Family History   Problem Relation Age of Onset   • Hypertension Mother    • Hyperlipidemia Mother    • Lung Disease Father         emphysema (smoking)   • Cancer Sister         breast CA   • Hypertension Sister    • Hyperlipidemia Sister           OBJECTIVE:   /74   Pulse 72   Temp 36.5 °C (97.7 °F)   Ht 1.727 m (5' 8\")   Wt 75.3 kg (166 lb)   SpO2 96%   BMI 25.24 kg/m²   Body mass index is 25.24 kg/m².    HEAD AND NECK:  Ears normal.  Throat, oral cavity and tongue normal.  Neck supple. No adenopathy or masses in the neck or supraclavicular regions.  No carotid bruits. No thyromegaly.    CHEST:  Clear, good air entry, no wheezes or rales.   HEART:  Regular rate and rhythm.  S1 and S2 normal.   No edema or JVD. ABDOMEN:  Soft without tenderness, guarding, mass or organomegaly.  No CVA tenderness or inguinal adenopathy.   EXTREMITIES:  Extremities, reflexes and peripheral pulses are normal.   SKIN: color normal, vascularity normal, no edema, temperature normal   No rashes or suspicious skin lesions noted.     Breast Exam: Performed with instruction during examination. No axillary lymphadenopathy, no skin changes, no dominant masses. No nipple retraction    Pelvic Exam -  Normal external genitalia with no lesions. Normal vaginal mucosa with normal rugation and no discharge. Cervix with no visible lesions. No cervical motion tenderness. Uterus is normal sized with no masses. No adnexal tenderness or enlargement appreciated. Thin Prep Pap is obtained, vaginal swab is obtained and specimen(s) sent to lab  Rectal: deferred    <ASSESSMENT and PLAN>  1. Well woman exam with routine gynecological exam  THINPREP PAP WITH HPV       Discussed  breast self exam, mammography screening, adequate intake of " calcium and vitamin D, diet and exercise   Follow-up in 2 years for next Gyn exam and 2 years for next Pap.   Next office visit for recheck of chronic medical conditions is due in 6 months

## 2018-09-13 DIAGNOSIS — Z01.419 WELL WOMAN EXAM WITH ROUTINE GYNECOLOGICAL EXAM: ICD-10-CM

## 2018-09-14 LAB
CYTOLOGY REG CYTOL: NORMAL
HPV HR 12 DNA CVX QL NAA+PROBE: NEGATIVE
HPV16 DNA SPEC QL NAA+PROBE: NEGATIVE
HPV18 DNA SPEC QL NAA+PROBE: NEGATIVE
SPECIMEN SOURCE: NORMAL

## 2018-10-04 LAB
HAV IGM SERPL QL IA: NEGATIVE
HBV CORE IGM SER QL: NEGATIVE
HBV SURFACE AG SER QL: NEGATIVE
HCV AB SER QL: NEGATIVE

## 2018-11-06 RX ORDER — LISINOPRIL 20 MG/1
TABLET ORAL
Qty: 90 TAB | Refills: 0 | Status: SHIPPED | OUTPATIENT
Start: 2018-11-06 | End: 2019-02-01 | Stop reason: SDUPTHER

## 2018-11-06 NOTE — TELEPHONE ENCOUNTER
Requested Prescriptions     Signed Prescriptions Disp Refills   • lisinopril (PRINIVIL) 20 MG Tab 90 Tab 0     Sig: TAKE 1 TAB BY MOUTH EVERY DAY.     Authorizing Provider: AARON ESCOBAR A.P.R.N.    
Was the patient seen in the last year in this department? Yes    Does patient have an active prescription for medications requested? No     Received Request Via: Pharmacy  
no...

## 2019-01-25 RX ORDER — CHOLECALCIFEROL (VITAMIN D3) 1250 MCG
CAPSULE ORAL
Qty: 12 CAP | Refills: 0 | OUTPATIENT
Start: 2019-01-25

## 2019-01-25 NOTE — TELEPHONE ENCOUNTER
Refill declined as her vitamin D levels were within the normal range in May 2018.  She should establish with a new PCP and have labs drawn to determine the need for high dose of vitamin D.  Sherri Goldman M.D.

## 2019-02-01 NOTE — TELEPHONE ENCOUNTER
Was the patient seen in the last year in this department? Yes NO NEW PATIENT APPOINTMENT SCHEDULED      Does patient have an active prescription for medications requested? No     Received Request Via: Pharmacy

## 2019-02-04 RX ORDER — LISINOPRIL 20 MG/1
TABLET ORAL
Qty: 30 TAB | Refills: 1 | Status: SHIPPED
Start: 2019-02-04 | End: 2019-03-11 | Stop reason: SDUPTHER

## 2019-02-04 NOTE — TELEPHONE ENCOUNTER
Requested Prescriptions     Signed Prescriptions Disp Refills   • lisinopril (PRINIVIL) 20 MG Tab 30 Tab 1     Sig: TAKE 1 TABLET BY MOUTH EVERY DAY     Authorizing Provider: AARON ESCOBAR     Refill for 30 days + 1.  Needs new pcp appointment  DONOVAN Tello

## 2019-02-04 NOTE — TELEPHONE ENCOUNTER
Prescription faxed to:    [facility]/pharmacy #[id number] - [place] - [address]  [address] [facility]  [place] [address]  Phone: [phone] Fax: [phone]  .

## 2019-02-04 NOTE — TELEPHONE ENCOUNTER
1. Caller Name: Trang Moses                                           Call Back Number: [phone] (home)         Patient approves a detailed voicemail message: N\A    Patient informed she needs establishing appt with new provider for further refills

## 2019-02-07 RX ORDER — CHOLECALCIFEROL (VITAMIN D3) 1250 MCG
CAPSULE ORAL
Qty: 12 CAP | Refills: 0 | OUTPATIENT
Start: 2019-02-07

## 2019-02-07 NOTE — TELEPHONE ENCOUNTER
Requested Prescriptions     Refused Prescriptions Disp Refills   • Cholecalciferol (VITAMIN D3) 63752 units Cap [Pharmacy Med Name: VITAMIN D3 50,000 UNIT CAPSULE] 12 Cap 0     Sig: TAKE 1 CAPSULE BY MOUTH EVERY 7 DAYS.     Refused By: AARON ESCOBAR     Reason for Refusal: Patient needs appointment.     Patient needs to establish with new pcp  DONOVAN Tello

## 2019-02-07 NOTE — TELEPHONE ENCOUNTER
Was the patient seen in the last year in this department? Yes    Does patient have an active prescription for medications requested? No     Received Request Via: Pharmacy     Pt needs a new pcp

## 2019-02-11 ENCOUNTER — OFFICE VISIT (OUTPATIENT)
Dept: MEDICAL GROUP | Facility: PHYSICIAN GROUP | Age: 59
End: 2019-02-11
Payer: COMMERCIAL

## 2019-02-11 VITALS
WEIGHT: 166 LBS | HEIGHT: 68 IN | HEART RATE: 95 BPM | SYSTOLIC BLOOD PRESSURE: 120 MMHG | DIASTOLIC BLOOD PRESSURE: 74 MMHG | BODY MASS INDEX: 25.16 KG/M2 | OXYGEN SATURATION: 98 % | TEMPERATURE: 98.2 F

## 2019-02-11 DIAGNOSIS — E78.5 HYPERLIPIDEMIA, UNSPECIFIED HYPERLIPIDEMIA TYPE: ICD-10-CM

## 2019-02-11 DIAGNOSIS — R79.89 LOW SERUM VITAMIN D: ICD-10-CM

## 2019-02-11 DIAGNOSIS — I10 ESSENTIAL HYPERTENSION: ICD-10-CM

## 2019-02-11 DIAGNOSIS — G35 MS (MULTIPLE SCLEROSIS) (HCC): ICD-10-CM

## 2019-02-11 PROCEDURE — 99214 OFFICE O/P EST MOD 30 MIN: CPT | Performed by: NURSE PRACTITIONER

## 2019-02-11 RX ORDER — CHOLECALCIFEROL (VITAMIN D3) 1250 MCG
1 CAPSULE ORAL
Qty: 12 CAP | Refills: 2 | Status: SHIPPED | OUTPATIENT
Start: 2019-02-11 | End: 2019-05-22

## 2019-02-11 ASSESSMENT — PATIENT HEALTH QUESTIONNAIRE - PHQ9: CLINICAL INTERPRETATION OF PHQ2 SCORE: 0

## 2019-02-11 NOTE — ASSESSMENT & PLAN NOTE
bp today is at goal.  120/74.  Taking lisinopril daily.  No chest pain reported today.  Refill provided.

## 2019-02-11 NOTE — PROGRESS NOTES
"Chief Complaint   Patient presents with   • Medication Refill     Vitamin D       Subjective:   Trang Moses is a 58 y.o. Female patient of Dr. Lara here today for evaluation and management of:    Low serum vitamin D  Needing refill today.  Has appointment with new PCP in July.  Will refill and order updated labs.     Essential hypertension  bp today is at goal.  120/74.  Taking lisinopril daily.  No chest pain reported today.  Refill provided.     MS (multiple sclerosis) (CMS-[facility])  Reports that this issue has been in remission for 20+ years.  No current symptoms or neurology following.      Patient has appointment in July to establish with Dr. Sorto    Current medicines (including changes today)  Current Outpatient Prescriptions   Medication Sig Dispense Refill   • Cholecalciferol (VITAMIN D3) 34814 units Cap Take 1 Cap by mouth every 7 days. 12 Cap 2   • lisinopril (PRINIVIL) 20 MG Tab TAKE 1 TABLET BY MOUTH EVERY DAY 30 Tab 1   • aspirin (ASA) 325 MG Tab Take 325 mg by mouth every 6 hours as needed.     • therapeutic multivitamin-minerals (THERAGRAN-M) Tab Take 1 Tab by mouth every day.       No current facility-administered medications for this visit.      She  has a past medical history of Hypertension and MS (multiple sclerosis) ([facility]).    ROS as stated in hpi  No chest pain, no shortness of breath, no abdominal pain       Objective:     Blood pressure 120/74, pulse 95, temperature 36.8 °C (98.2 °F), height 1.727 m (5' 8\"), weight 75.3 kg (166 lb), SpO2 98 %. Body mass index is 25.24 kg/m². at goal  Physical Exam:  Constitutional: Alert, no distress.  Skin: Warm, dry, good turgor,no cyanosis, no rashes in visible areas.  Eye: Equal, round and reactive, conjunctiva clear, lids normal.  Ears: No tenderness, no discharge.  External canals are without any significant edema or erythema.Gross auditory acuity is intact.  Nose: symmetrical without tenderness, no discharge.  Mouth/Throat: lips without lesion.  Oropharynx " clear.  Neck: Trachea midline, no masses, no obvious thyroid enlargement... Range of motion within normal limits.  Neuro: Cranial nerves 2-12 grossly intact.  No sensory deficit.  Respiratory: Unlabored respiratory effort, lungs clear to auscultation, no wheezes, no ronchi.  Cardiovascular: Normal S1, S2, no murmur, no edema.  Psych: Alert and oriented x3, normal affect and mood and judgement.        Assessment and Plan:   The following treatment plan was discussed    1. Low serum vitamin D  This is a new problem to me.  Chronic, ongoing.  Refill provided for weekly medication.  Improvement noted on last years labs.  New lab orders provided.    - VITAMIN D,25 HYDROXY; Future    2. Hyperlipidemia, unspecified hyperlipidemia type  This is a ne wproblem to me.  Chronic, ongoing.  No medications at this time.  Updated labs ordered.  Monitor.   - Lipid Profile; Future    3. Essential hypertension  This is a new problem to me.  Chornic, ongoing, stable with lisinopril.  Continue with good exercise program.  Has appointment with Dr. Sorto to establish care in July.  Updated labs prior to that appointment.   - CBC WITH DIFFERENTIAL; Future  - Comp Metabolic Panel; Future  - TSH; Future    4. MS (multiple sclerosis) (HCC)  This is a new problem to me.  Chronic, ongoing. Stable at this time.  No medications, no symptoms reported.        Followup: Return in about 6 months (around 8/11/2019) for establish with new pcp.         Educated in proper administration of medication(s) ordered today including safety, possible SE, risks, benefits, rationale and alternatives to therapy.     Please note that this dictation was created using voice recognition software. I have made every reasonable attempt to correct obvious errors, but I expect that there are errors of grammar and possibly content that I did not discover before finalizing the note.

## 2019-02-11 NOTE — ASSESSMENT & PLAN NOTE
Needing refill today.  Has appointment with new PCP in July.  Will refill and order updated labs.

## 2019-02-11 NOTE — ASSESSMENT & PLAN NOTE
Reports that this issue has been in remission for 20+ years.  No current symptoms or neurology following.

## 2019-03-11 RX ORDER — LISINOPRIL 20 MG/1
20 TABLET ORAL
Qty: 90 TAB | Refills: 0 | Status: SHIPPED | OUTPATIENT
Start: 2019-03-11 | End: 2019-05-22 | Stop reason: SDUPTHER

## 2019-03-11 NOTE — TELEPHONE ENCOUNTER
Requested Prescriptions     Pending Prescriptions Disp Refills   • lisinopril (PRINIVIL) 20 MG Tab 90 Tab 0     Sig: Take 1 Tab by mouth every day. Pt needs to keep 7/18/19 appt with new PCP for future refills. Please complete ordered labs before appt.       RODERICK Herring.

## 2019-03-11 NOTE — TELEPHONE ENCOUNTER
Was the patient seen in the last year in this department? Yes LOV 02/11/19 with Suzie Mina Pt is establishing with Dr. Sorto on 07/18/19 LABS ORDERED NOT COMPLETE    Does patient have an active prescription for medications requested? No     Received Request Via: Pharmacy

## 2019-04-01 ENCOUNTER — OFFICE VISIT (OUTPATIENT)
Dept: URGENT CARE | Facility: PHYSICIAN GROUP | Age: 59
End: 2019-04-01
Payer: COMMERCIAL

## 2019-04-01 VITALS
OXYGEN SATURATION: 99 % | HEART RATE: 106 BPM | TEMPERATURE: 98.7 F | HEIGHT: 68 IN | SYSTOLIC BLOOD PRESSURE: 100 MMHG | DIASTOLIC BLOOD PRESSURE: 72 MMHG | WEIGHT: 166 LBS | BODY MASS INDEX: 25.16 KG/M2

## 2019-04-01 DIAGNOSIS — J01.90 ACUTE NON-RECURRENT SINUSITIS, UNSPECIFIED LOCATION: Primary | ICD-10-CM

## 2019-04-01 PROCEDURE — 99214 OFFICE O/P EST MOD 30 MIN: CPT | Performed by: NURSE PRACTITIONER

## 2019-04-01 RX ORDER — CEFIXIME 400 MG/1
1 CAPSULE ORAL DAILY
Qty: 7 CAP | Refills: 0 | Status: SHIPPED | OUTPATIENT
Start: 2019-04-01 | End: 2019-04-08

## 2019-04-01 ASSESSMENT — ENCOUNTER SYMPTOMS
CONSTITUTIONAL NEGATIVE: 1
NEUROLOGICAL NEGATIVE: 1
CARDIOVASCULAR NEGATIVE: 1
CHILLS: 0
SINUS PRESSURE: 1
SPUTUM PRODUCTION: 1
SHORTNESS OF BREATH: 0
COUGH: 1
FEVER: 0

## 2019-04-01 NOTE — PROGRESS NOTES
Subjective:     Trang Moses is a 58 y.o. female who presents for Cough (congestion,sinus x1wk)       Sinusitis   This is a new problem. Episode onset: 5 days ago. The problem has been gradually worsening since onset. There has been no fever. Associated symptoms include congestion, coughing, ear pain (congestion) and sinus pressure (and pain). Pertinent negatives include no chills or shortness of breath. Treatments tried: Decongestants, Flonase, saline sprays. The treatment provided mild relief.   Patient has a history of sinus infections in the past. Reports a history of environmental allergies.    PMH:  has a past medical history of Hypertension and MS (multiple sclerosis) ([facility]).    MEDS:   Current Outpatient Prescriptions:   •  Cefixime 400 MG Cap, Take 1 Capsule by mouth every day for 7 days., Disp: 7 Cap, Rfl: 0  •  lisinopril (PRINIVIL) 20 MG Tab, Take 1 Tab by mouth every day. Pt needs to keep 7/18/19 appt with new PCP for future refills. Please complete ordered labs before appt., Disp: 90 Tab, Rfl: 0  •  Cholecalciferol (VITAMIN D3) 04149 units Cap, Take 1 Cap by mouth every 7 days., Disp: 12 Cap, Rfl: 2  •  aspirin (ASA) 325 MG Tab, Take 325 mg by mouth every 6 hours as needed., Disp: , Rfl:   •  therapeutic multivitamin-minerals (THERAGRAN-M) Tab, Take 1 Tab by mouth every day., Disp: , Rfl:     ALLERGIES:   Allergies   Allergen Reactions   • Doxycycline Rash     recent   • Morphine Anaphylaxis   • Penicillins Rash     .     SURGHX:   Past Surgical History:   Procedure Laterality Date   • BREAST RECONSTRUCTION     • CHOLECYSTECTOMY       SOCHX:  reports that she has never smoked. She has never used smokeless tobacco. She reports that she drinks alcohol. She reports that she does not use drugs.     FH: Reviewed with patient, not pertinent to this visit.     Review of Systems   Constitutional: Negative.  Negative for chills, fever and malaise/fatigue.   HENT: Positive for congestion, ear pain (congestion) and  "sinus pressure (and pain).    Respiratory: Positive for cough and sputum production. Negative for shortness of breath.    Cardiovascular: Negative.    Neurological: Negative.    All other systems reviewed and are negative.    Objective:     /72   Pulse (!) 106   Temp 37.1 °C (98.7 °F) (Temporal)   Ht 1.727 m (5' 8\")   Wt 75.3 kg (166 lb)   SpO2 99%   BMI 25.24 kg/m²     Physical Exam   Constitutional: She is oriented to person, place, and time. She appears well-developed and well-nourished. She is cooperative.  Non-toxic appearance. No distress.   HENT:   Head: Normocephalic and atraumatic.   Right Ear: External ear normal.   Left Ear: External ear normal.   Nose: Mucosal edema present. Right sinus exhibits frontal sinus tenderness. Left sinus exhibits frontal sinus tenderness.   Mouth/Throat: Uvula is midline and mucous membranes are normal. Posterior oropharyngeal erythema present. No oropharyngeal exudate or posterior oropharyngeal edema.   Dull TMs bilaterally   Eyes: Pupils are equal, round, and reactive to light. Conjunctivae and EOM are normal.   Neck: Normal range of motion.   Cardiovascular: Regular rhythm, normal heart sounds and normal pulses.  Tachycardia present.    Pulmonary/Chest: Effort normal and breath sounds normal. No respiratory distress. She has no decreased breath sounds.   Abdominal: Soft. Bowel sounds are normal. There is no tenderness.   Musculoskeletal: Normal range of motion. She exhibits no deformity.   Lymphadenopathy:     She has no cervical adenopathy.   Neurological: She is alert and oriented to person, place, and time. She has normal strength. No sensory deficit.   Skin: Skin is warm, dry and intact. Capillary refill takes less than 2 seconds.   Psychiatric: She has a normal mood and affect. Her behavior is normal.   Vitals reviewed.       Assessment/Plan:     1. Acute non-recurrent sinusitis, unspecified location  - Cefixime 400 MG Cap; Take 1 Capsule by mouth every day " for 7 days.  Dispense: 7 Cap; Refill: 0    Rx sent electronically. Allergy to penicillin and doxycycline noted. Patient states she has tolerated cephalosporins in the past. Discussed continuing OTC decongestants (e.g. Sudafed), antihistamines, Flonase, and nasal saline rinses/neti pot. Discussed continuing supportive measures including increasing fluids and rest as well as OTC symptom management including acetaminophen and/or ibuprofen PRN pain and/or fever.     Patient advised to: Return for 1) Symptoms don't improve or worsen, or go to ER, 2) Follow up with primary care in 7-10 days.    Differential diagnosis, natural history, supportive care, and indications for immediate follow-up discussed. All questions answered. Patient agrees with the plan of care.

## 2019-05-15 ENCOUNTER — HOSPITAL ENCOUNTER (OUTPATIENT)
Dept: LAB | Facility: MEDICAL CENTER | Age: 59
End: 2019-05-15
Attending: NURSE PRACTITIONER
Payer: COMMERCIAL

## 2019-05-15 DIAGNOSIS — I10 ESSENTIAL HYPERTENSION: ICD-10-CM

## 2019-05-15 DIAGNOSIS — R79.89 LOW SERUM VITAMIN D: ICD-10-CM

## 2019-05-15 DIAGNOSIS — E78.5 HYPERLIPIDEMIA, UNSPECIFIED HYPERLIPIDEMIA TYPE: ICD-10-CM

## 2019-05-15 LAB
25(OH)D3 SERPL-MCNC: 106 NG/ML (ref 30–100)
ALBUMIN SERPL BCP-MCNC: 4.3 G/DL (ref 3.2–4.9)
ALBUMIN/GLOB SERPL: 1.2 G/DL
ALP SERPL-CCNC: 66 U/L (ref 30–99)
ALT SERPL-CCNC: 111 U/L (ref 2–50)
ANION GAP SERPL CALC-SCNC: 7 MMOL/L (ref 0–11.9)
AST SERPL-CCNC: 64 U/L (ref 12–45)
BASOPHILS # BLD AUTO: 1.3 % (ref 0–1.8)
BASOPHILS # BLD: 0.08 K/UL (ref 0–0.12)
BILIRUB SERPL-MCNC: 1.5 MG/DL (ref 0.1–1.5)
BUN SERPL-MCNC: 19 MG/DL (ref 8–22)
CALCIUM SERPL-MCNC: 9.9 MG/DL (ref 8.5–10.5)
CHLORIDE SERPL-SCNC: 101 MMOL/L (ref 96–112)
CHOLEST SERPL-MCNC: 275 MG/DL (ref 100–199)
CO2 SERPL-SCNC: 27 MMOL/L (ref 20–33)
CREAT SERPL-MCNC: 0.7 MG/DL (ref 0.5–1.4)
EOSINOPHIL # BLD AUTO: 0.05 K/UL (ref 0–0.51)
EOSINOPHIL NFR BLD: 0.8 % (ref 0–6.9)
ERYTHROCYTE [DISTWIDTH] IN BLOOD BY AUTOMATED COUNT: 47.1 FL (ref 35.9–50)
GLOBULIN SER CALC-MCNC: 3.5 G/DL (ref 1.9–3.5)
GLUCOSE SERPL-MCNC: 97 MG/DL (ref 65–99)
HCT VFR BLD AUTO: 46.4 % (ref 37–47)
HDLC SERPL-MCNC: 70 MG/DL
HGB BLD-MCNC: 14.9 G/DL (ref 12–16)
IMM GRANULOCYTES # BLD AUTO: 0.02 K/UL (ref 0–0.11)
IMM GRANULOCYTES NFR BLD AUTO: 0.3 % (ref 0–0.9)
LDLC SERPL CALC-MCNC: 177 MG/DL
LYMPHOCYTES # BLD AUTO: 1.87 K/UL (ref 1–4.8)
LYMPHOCYTES NFR BLD: 30.7 % (ref 22–41)
MCH RBC QN AUTO: 32.3 PG (ref 27–33)
MCHC RBC AUTO-ENTMCNC: 32.1 G/DL (ref 33.6–35)
MCV RBC AUTO: 100.4 FL (ref 81.4–97.8)
MONOCYTES # BLD AUTO: 0.48 K/UL (ref 0–0.85)
MONOCYTES NFR BLD AUTO: 7.9 % (ref 0–13.4)
NEUTROPHILS # BLD AUTO: 3.6 K/UL (ref 2–7.15)
NEUTROPHILS NFR BLD: 59 % (ref 44–72)
NRBC # BLD AUTO: 0 K/UL
NRBC BLD-RTO: 0 /100 WBC
PLATELET # BLD AUTO: 267 K/UL (ref 164–446)
PMV BLD AUTO: 11.3 FL (ref 9–12.9)
POTASSIUM SERPL-SCNC: 4.2 MMOL/L (ref 3.6–5.5)
PROT SERPL-MCNC: 7.8 G/DL (ref 6–8.2)
RBC # BLD AUTO: 4.62 M/UL (ref 4.2–5.4)
SODIUM SERPL-SCNC: 135 MMOL/L (ref 135–145)
TRIGL SERPL-MCNC: 140 MG/DL (ref 0–149)
TSH SERPL DL<=0.005 MIU/L-ACNC: 1.87 UIU/ML (ref 0.38–5.33)
WBC # BLD AUTO: 6.1 K/UL (ref 4.8–10.8)

## 2019-05-15 PROCEDURE — 36415 COLL VENOUS BLD VENIPUNCTURE: CPT

## 2019-05-15 PROCEDURE — 85025 COMPLETE CBC W/AUTO DIFF WBC: CPT

## 2019-05-15 PROCEDURE — 82306 VITAMIN D 25 HYDROXY: CPT

## 2019-05-15 PROCEDURE — 84443 ASSAY THYROID STIM HORMONE: CPT

## 2019-05-15 PROCEDURE — 80061 LIPID PANEL: CPT

## 2019-05-15 PROCEDURE — 80053 COMPREHEN METABOLIC PANEL: CPT

## 2019-05-20 ENCOUNTER — HOSPITAL ENCOUNTER (OUTPATIENT)
Dept: RADIOLOGY | Facility: MEDICAL CENTER | Age: 59
End: 2019-05-20
Attending: FAMILY MEDICINE
Payer: COMMERCIAL

## 2019-05-20 DIAGNOSIS — Z12.39 SCREENING BREAST EXAMINATION: ICD-10-CM

## 2019-05-20 PROCEDURE — 77063 BREAST TOMOSYNTHESIS BI: CPT

## 2019-05-22 ENCOUNTER — OFFICE VISIT (OUTPATIENT)
Dept: MEDICAL GROUP | Facility: PHYSICIAN GROUP | Age: 59
End: 2019-05-22
Payer: COMMERCIAL

## 2019-05-22 VITALS
HEART RATE: 76 BPM | SYSTOLIC BLOOD PRESSURE: 122 MMHG | HEIGHT: 68 IN | OXYGEN SATURATION: 96 % | TEMPERATURE: 98.5 F | DIASTOLIC BLOOD PRESSURE: 86 MMHG | WEIGHT: 170 LBS | BODY MASS INDEX: 25.76 KG/M2 | RESPIRATION RATE: 12 BRPM

## 2019-05-22 DIAGNOSIS — R73.9 HYPERGLYCEMIA: ICD-10-CM

## 2019-05-22 DIAGNOSIS — J30.1 SEASONAL ALLERGIC RHINITIS DUE TO POLLEN: ICD-10-CM

## 2019-05-22 DIAGNOSIS — E78.00 PURE HYPERCHOLESTEROLEMIA: ICD-10-CM

## 2019-05-22 DIAGNOSIS — M54.41 CHRONIC MIDLINE LOW BACK PAIN WITH RIGHT-SIDED SCIATICA: ICD-10-CM

## 2019-05-22 DIAGNOSIS — K21.9 GASTROESOPHAGEAL REFLUX DISEASE, ESOPHAGITIS PRESENCE NOT SPECIFIED: ICD-10-CM

## 2019-05-22 DIAGNOSIS — R92.2 DENSE BREAST: ICD-10-CM

## 2019-05-22 DIAGNOSIS — B15.9 VIRAL HEPATITIS A WITHOUT HEPATIC COMA: ICD-10-CM

## 2019-05-22 DIAGNOSIS — E67.3 HIGH VITAMIN D LEVEL: ICD-10-CM

## 2019-05-22 DIAGNOSIS — I10 ESSENTIAL HYPERTENSION: ICD-10-CM

## 2019-05-22 DIAGNOSIS — Z23 NEED FOR VACCINATION: ICD-10-CM

## 2019-05-22 DIAGNOSIS — Z51.81 MEDICATION MONITORING ENCOUNTER: ICD-10-CM

## 2019-05-22 DIAGNOSIS — G35 MS (MULTIPLE SCLEROSIS) (HCC): ICD-10-CM

## 2019-05-22 DIAGNOSIS — Z79.82 ASPIRIN LONG-TERM USE: ICD-10-CM

## 2019-05-22 DIAGNOSIS — R79.89 ELEVATED LFTS: ICD-10-CM

## 2019-05-22 DIAGNOSIS — R92.30 DENSE BREAST: ICD-10-CM

## 2019-05-22 DIAGNOSIS — G89.29 CHRONIC MIDLINE LOW BACK PAIN WITH RIGHT-SIDED SCIATICA: ICD-10-CM

## 2019-05-22 DIAGNOSIS — E53.8 VITAMIN B12 DEFICIENCY: ICD-10-CM

## 2019-05-22 PROCEDURE — 90471 IMMUNIZATION ADMIN: CPT | Performed by: FAMILY MEDICINE

## 2019-05-22 PROCEDURE — 99215 OFFICE O/P EST HI 40 MIN: CPT | Mod: 25 | Performed by: FAMILY MEDICINE

## 2019-05-22 PROCEDURE — 90715 TDAP VACCINE 7 YRS/> IM: CPT | Performed by: FAMILY MEDICINE

## 2019-05-22 RX ORDER — OMEPRAZOLE 40 MG/1
40 CAPSULE, DELAYED RELEASE ORAL DAILY
Qty: 30 CAP | Refills: 1 | Status: SHIPPED | OUTPATIENT
Start: 2019-05-22 | End: 2019-07-18 | Stop reason: SDUPTHER

## 2019-05-22 RX ORDER — MONTELUKAST SODIUM 10 MG/1
10 TABLET ORAL DAILY
Qty: 30 TAB | Refills: 3 | Status: SHIPPED | OUTPATIENT
Start: 2019-05-22 | End: 2019-09-03

## 2019-05-22 RX ORDER — LISINOPRIL 20 MG/1
20 TABLET ORAL
Qty: 90 TAB | Refills: 0 | Status: SHIPPED | OUTPATIENT
Start: 2019-05-22 | End: 2019-09-03 | Stop reason: SDUPTHER

## 2019-05-22 RX ORDER — OMEPRAZOLE 20 MG/1
20 CAPSULE, DELAYED RELEASE ORAL DAILY
COMMUNITY
End: 2019-05-22

## 2019-05-22 RX ORDER — CHOLECALCIFEROL (VITAMIN D3) 125 MCG
CAPSULE ORAL
COMMUNITY
End: 2019-09-03

## 2019-05-22 RX ORDER — CEFDINIR 300 MG/1
300 CAPSULE ORAL 2 TIMES DAILY
Refills: 0 | COMMUNITY
Start: 2019-04-01 | End: 2019-05-22

## 2019-05-22 NOTE — PROGRESS NOTES
cc: Establish care, Tdap vaccine, seasonal allergies, elevated liver function, hypertension, multiple sclerosis    Subjective:     Trang Moses is a 58 y.o. female presenting reports she had multiple sclerosis that relapse mainly with eye symptoms when she was pregnant over 20 years ago and has not had any issues and over 20 years and she is not on any medication for the multiple sclerosis she has had MRIs and lumbar taps done in the past in [place].  She does have a sister who also has multiple sclerosis.  She is not having any eye symptoms right now.  Is not seeing any specialists right now or having any issues with this.  She does have some chronic back pain and she has been taking about 2 ibuprofens daily for a few weeks.  She is taking some omeprazole 20 mg daily as she is developing some acid reflux.  She has had a colonoscopy done in the past with no polyps and is due for that 10 years from her last anoscopy.  He does do annual mammograms which are within normal limits and she did have a Pap test done in 2018 which was within normal limits.  She has been on lisinopril 20 mg for hypertension for about 3 years.  She denies any heart attack or stroke.  She denies any recent ER visits or hospitalizations.  She does struggle with seasonal allergies.  Along with the ibuprofen she does take 325 mg of aspirin for years.  She had some lab work done May 15, 2019 which showed complete blood count within normal limits, kidney GFR within normal limits greater than 60, CMP with LFTs with ALT elevated at 111 and AST at 64 which has come down from previously and May 2018 where AST was 140 and ALT is 188.  Her lipid panel is also high with LDL at 177 and total cholesterol at 275.  LDL has increased from 155 before and total cholesterol was 289 before.  TSH thyroid is 1.8 within normal limits, vitamin D high at 106.  She did have hepatitis A in the past and had a liver ultrasound done in June 2018 which showed fatty  "infiltration and/or possible hepatocellular disease and she was feeling fatigued last year which has improved.  Lives with . Laid off. Moved from [place]. NO social or domestic concerns.  Review of systems:     Constitutional: Negative for fever, chills and negative fatigue.   HENT: Negative for sinus pressure, negative for ear pain or hearing loss  Eyes: Negative for blurriness, negative for double vision  Respiratory: Negative for cough and shortness of breath, negative for exertional shortness of breath  Cardiovascular: Negative for leg swelling, negative for palpitations, negative for chest pain  Gastrointestinal: Negative for nausea, vomiting, abdominal pain, constipation and diarrhea., Positive heart burn  Genitourinary: Negative for dysuria and hematuria.   Skin: Negative for rash.   Neurological: Negative for dizziness, focal weakness and headaches.   Endo/Heme/Allergies: Denies bleeding, bruising, and recurrent allergies.  Psychiatric/Behavioral: Negative for depression and anxiety.        Current Outpatient Prescriptions:   •  Melatonin 5 MG Tab, Take  by mouth., Disp: , Rfl:   •  omeprazole (PRILOSEC) 40 MG delayed-release capsule, Take 1 Cap by mouth every day., Disp: 30 Cap, Rfl: 1  •  lisinopril (PRINIVIL) 20 MG Tab, Take 1 Tab by mouth every day., Disp: 90 Tab, Rfl: 0  •  montelukast (SINGULAIR) 10 MG Tab, Take 1 Tab by mouth every day., Disp: 30 Tab, Rfl: 3  •  therapeutic multivitamin-minerals (THERAGRAN-M) Tab, Take 1 Tab by mouth every day., Disp: , Rfl:     Allergies, past medical history, past surgical history, family history, social history reviewed and updated    Objective:     Vitals: /86 (BP Location: Left arm, Patient Position: Sitting, BP Cuff Size: Adult)   Pulse 76   Temp 36.9 °C (98.5 °F) (Temporal)   Resp 12   Ht 1.727 m (5' 8\")   Wt 77.1 kg (170 lb)   SpO2 96%   Breastfeeding? No   BMI 25.85 kg/m²   General: Alert, pleasant, NAD  HEENT: Normocephalic.  " Nontraumatic. EOMI, no icterus or pallor.  Conjunctivae and lids normal. External ears normal. Oropharynx non-erythematous, mucous membranes moist.  Neck supple.  No thyromegaly or masses palpated. No cervical or supraclavicular lymphadenopathy. BL TM with fluid behind TM.   Heart: Regular rate and rhythm.  S1 and S2 normal.  No murmurs appreciated.  Respiratory: Normal respiratory effort.  Clear to auscultation bilaterally.  Abdomen: Non-distended, soft, non tender in all 4 quadrants.  Skin: Warm, dry, no rashes.  Musculoskeletal: Gait is normal.  Moves all extremities well. Good extension and flexion at waist and no tenderness on deep palpation of lower back.  Extremities: No leg edema.  Pedal pulses 2+ symmetric.   Psych:  Affect/mood is normal, judgement is good, memory is intact, grooming is appropriate.    Assessment/Plan:     Diagnoses and all orders for this visit:    Essential hypertension  -     lisinopril (PRINIVIL) 20 MG Tab; Take 1 Tab by mouth every day.    Need for vaccination  -     TDAP VACCINE =>6YO IM    MS (multiple sclerosis) (HCC)    Elevated LFTs  -     HEPATITIS PANEL ACUTE(4 COMPONENTS); Future  -     IRON/TOTAL IRON BIND; Future  -     FERRITIN; Future  -     US-RUQ; Future  -     AFP SERUM TUMOR MARKER; Future  -     Prothrombin Time; Future  -     CERULOPLASMIN; Future    Seasonal allergic rhinitis due to pollen  -     montelukast (SINGULAIR) 10 MG Tab; Take 1 Tab by mouth every day.    Chronic midline low back pain with right-sided sciatica    Dense breast    High vitamin D level    Pure hypercholesterolemia    Aspirin long-term use  -     REFERRAL TO GASTROENTEROLOGY    Gastroesophageal reflux disease, esophagitis presence not specified  -     REFERRAL TO GASTROENTEROLOGY  -     omeprazole (PRILOSEC) 40 MG delayed-release capsule; Take 1 Cap by mouth every day.    Viral hepatitis A without hepatic coma    Hyperglycemia  -     HEMOGLOBIN A1C; Future    Vitamin B12 deficiency  -      VITAMIN B12; Future    Medication monitoring encounter  -     US-RUQ; Future  -     AFP SERUM TUMOR MARKER; Future  -     Prothrombin Time; Future    -She had some lab work done May 15, 2019 which showed complete blood count within normal limits, kidney GFR within normal limits greater than 60, CMP with LFTs with ALT elevated at 111 and AST at 64 which has come down from previously and May 2018 where AST was 140 and ALT is 188.  Her lipid panel is also high with LDL at 177 and total cholesterol at 275.    -Please get nonfasting lab work at least 2 weeks before your follow-up appointment with me and a liver ultrasound as well.  We will also stop vitamin D for now as it was too high and start taking twice a day over-the-counter omega-3 fatty acid fish oil and red yeast twice a day with meals and patient instructions given on fat and cholesterol restricted diets.  So please check your blood pressure the top number and bottom number and heart rate in both arms sometimes in the morning, afternoon, evening about twice a week and bring this at your next appointment.  Also please stop taking aspirin right now and take 40 mg of omeprazole once daily in the morning on empty stomach and will send her to the GI for possible endoscopy.  Also please decrease her ibuprofen and limit that and avoid any Tylenol or alcohol at this moment as I could damage her liver and LFTs are still high.  She did have hepatitis A in the past and will get lab work done and consider hep B vaccination later on.  Tdap vaccine given today.  Continue lisinopril 20 mg for now for your blood pressure.  She does have dense breasts and her last mammogram was within normal limits and she is not due for one for another year.  For seasonal allergies will recommend to do daily over-the-counter saline rinses or [place] [employer] twice a day and herbal teas but also try montelukast daily as prevention bedtime and then Flonase 1 to to 2 sprays as needed in each nostril  during the day and then over-the-counter antihistamine as needed.  For the low back pain try over-the-counter icy  rubs and patches and doing daily yoga stretching and icing and heating and can also consider ordering a TENS unit and let me know if he needs some physical therapy in the future and please look at the patient instruction section for back exercises.  Please avoid pain medications for now due to liver damage and possible ulcer.    Return in about 6 weeks (around 7/3/2019) for Long, 40 min appnt.    Patient was seen for 60 minutes face-to-face of which greater than 50% of the visit was spent in counseling and coordination of care as documented above in their assessment and plan.

## 2019-05-22 NOTE — PATIENT INSTRUCTIONS
Diagnoses and all orders for this visit:    Essential hypertension  -     lisinopril (PRINIVIL) 20 MG Tab; Take 1 Tab by mouth every day.    Need for vaccination  -     TDAP VACCINE =>9YO IM    MS (multiple sclerosis) (HCC)    Elevated LFTs  -     HEPATITIS PANEL ACUTE(4 COMPONENTS); Future  -     IRON/TOTAL IRON BIND; Future  -     FERRITIN; Future  -     US-RUQ; Future  -     AFP SERUM TUMOR MARKER; Future  -     Prothrombin Time; Future  -     CERULOPLASMIN; Future    Seasonal allergic rhinitis due to pollen  -     montelukast (SINGULAIR) 10 MG Tab; Take 1 Tab by mouth every day.    Chronic midline low back pain with right-sided sciatica    Dense breast    High vitamin D level    Pure hypercholesterolemia    Aspirin long-term use  -     REFERRAL TO GASTROENTEROLOGY    Gastroesophageal reflux disease, esophagitis presence not specified  -     REFERRAL TO GASTROENTEROLOGY  -     omeprazole (PRILOSEC) 40 MG delayed-release capsule; Take 1 Cap by mouth every day.    Viral hepatitis A without hepatic coma    Hyperglycemia  -     HEMOGLOBIN A1C; Future    Vitamin B12 deficiency  -     VITAMIN B12; Future    Medication monitoring encounter  -     US-RUQ; Future  -     AFP SERUM TUMOR MARKER; Future  -     Prothrombin Time; Future    -She had some lab work done May 15, 2019 which showed complete blood count within normal limits, kidney GFR within normal limits greater than 60, CMP with LFTs with ALT elevated at 111 and AST at 64 which has come down from previously and May 2018 where AST was 140 and ALT is 188.  Her lipid panel is also high with LDL at 177 and total cholesterol at 275.    -Please get nonfasting lab work at least 2 weeks before your follow-up appointment with me and a liver ultrasound as well.  We will also stop vitamin D for now as it was too high and start taking twice a day over-the-counter omega-3 fatty acid fish oil and red yeast twice a day with meals and patient instructions given on fat and  cholesterol restricted diets.  So please check your blood pressure the top number and bottom number and heart rate in both arms sometimes in the morning, afternoon, evening about twice a week and bring this at your next appointment.  Also please stop taking aspirin right now and take 40 mg of omeprazole once daily in the morning on empty stomach and will send her to the GI for possible endoscopy.  Also please decrease her ibuprofen and limit that and avoid any Tylenol or alcohol at this moment as I could damage her liver and LFTs are still high.  She did have hepatitis A in the past and will get lab work done and consider hep B vaccination later on.  Tdap vaccine given today.  Continue lisinopril 20 mg for now for your blood pressure.  She does have dense breasts and her last mammogram was within normal limits and she is not due for one for another year.  For seasonal allergies will recommend to do daily over-the-counter saline rinses or Ju Youssef twice a day and herbal teas but also try montelukast daily as prevention bedtime and then Flonase 1 to to 2 sprays as needed in each nostril during the day and then over-the-counter antihistamine as needed.  For the low back pain try over-the-counter icy  rubs and patches and doing daily yoga stretching and icing and heating and can also consider ordering a TENS unit and let me know if he needs some physical therapy in the future and please look at the patient instruction section for back exercises.  Please avoid pain medications for now due to liver damage and possible ulcer.    Return in about 6 weeks (around 7/3/2019) for Long, 40 min appnt.    Food Choices for Gastroesophageal Reflux Disease, Adult  When you have gastroesophageal reflux disease (GERD), the foods you eat and your eating habits are very important. Choosing the right foods can help ease the discomfort of GERD.  WHAT GENERAL GUIDELINES DO I NEED TO FOLLOW?  · Choose fruits, vegetables, whole  grains, low-fat dairy products, and low-fat meat, fish, and poultry.  · Limit fats such as oils, salad dressings, butter, nuts, and avocado.  · Keep a food diary to identify foods that cause symptoms.  · Avoid foods that cause reflux. These may be different for different people.  · Eat frequent small meals instead of three large meals each day.  · Eat your meals slowly, in a relaxed setting.  · Limit fried foods.  · Cook foods using methods other than frying.  · Avoid drinking alcohol.  · Avoid drinking large amounts of liquids with your meals.  · Avoid bending over or lying down until 2-3 hours after eating.  WHAT FOODS ARE NOT RECOMMENDED?  The following are some foods and drinks that may worsen your symptoms:  Vegetables  Tomatoes. Tomato juice. Tomato and spaghetti sauce. Chili peppers. Onion and garlic. Horseradish.  Fruits  Oranges, grapefruit, and lemon (fruit and juice).  Meats  High-fat meats, fish, and poultry. This includes hot dogs, ribs, ham, sausage, salami, and pickett.  Dairy  Whole milk and chocolate milk. Sour cream. Cream. Butter. Ice cream. Cream cheese.   Beverages  Coffee and tea, with or without caffeine. Carbonated beverages or energy drinks.  Condiments  Hot sauce. Barbecue sauce.   Sweets/Desserts  Chocolate and cocoa. Donuts. Peppermint and spearmint.  Fats and Oils  High-fat foods, including French fries and potato chips.  Other  Vinegar. Strong spices, such as black pepper, white pepper, red pepper, cayenne, christensen powder, cloves, ginger, and chili powder.  The items listed above may not be a complete list of foods and beverages to avoid. Contact your dietitian for more information.     This information is not intended to replace advice given to you by your health care provider. Make sure you discuss any questions you have with your health care provider.     Document Released: 12/18/2006 Document Revised: 01/08/2016 Document Reviewed: 10/22/2014  [employer] Interactive Patient Education ©2016  [employer]  Introduction  Your blood pressure on this visit to the emergency department or clinic is elevated. This does not necessarily mean you have high blood pressure (hypertension), but it does mean that your blood pressure needs to be rechecked. Many times your blood pressure can increase due to illness, pain, anxiety, or other factors.  We recommend that you get a series of blood pressure readings done over a period of 5 days. It is best to get a reading in the morning and one in the evening. You should make sure to sit and relax for 1-5 minutes before the reading is taken. Write the readings down and make a follow-up appointment with your health care provider to discuss the results. If there is not a free clinic or a drug store with a blood-pressure-taking machine near you, you can purchase blood-pressure-taking equipment from a drug store. Having one in the home allows you the convenience of taking your blood pressure while you are home and relaxed.  BLOOD PRESSURE LOG   Date: _______________________  · a.m. _____________________  · p.m. _____________________  Date: _______________________  · a.m. _____________________  · p.m. _____________________  Date: _______________________  · a.m. _____________________  · p.m. _____________________  Date: _______________________  · a.m. _____________________  · p.m. _____________________  Date: _______________________  · a.m. _____________________  · p.m. _____________________  This information is not intended to replace advice given to you by your health care provider. Make sure you discuss any questions you have with your health care provider.    Fat and Cholesterol Restricted Diet  High levels of fat and cholesterol in your blood may lead to various health problems, such as diseases of the heart, blood vessels, gallbladder, liver, and pancreas. Fats are concentrated sources of energy that come in various forms. Certain types of fat, including saturated fat, may  be harmful in excess. Cholesterol is a substance needed by your body in small amounts. Your body makes all the cholesterol it needs. Excess cholesterol comes from the food you eat.  When you have high levels of cholesterol and saturated fat in your blood, health problems can develop because the excess fat and cholesterol will gather along the walls of your blood vessels, causing them to narrow. Choosing the right foods will help you control your intake of fat and cholesterol. This will help keep the levels of these substances in your blood within normal limits and reduce your risk of disease.  WHAT IS MY PLAN?  Your health care provider recommends that you:  · Get no more than __________ % of the total calories in your daily diet from fat.  · Limit your intake of saturated fat to less than ______% of your total calories each day.  · Limit the amount of cholesterol in your diet to less than _________mg per day.  WHAT TYPES OF FAT SHOULD I CHOOSE?  · Choose healthy fats more often. Choose monounsaturated and polyunsaturated fats, such as olive and canola oil, flaxseeds, walnuts, almonds, and seeds.  · Eat more omega-3 fats. Good choices include salmon, mackerel, sardines, tuna, flaxseed oil, and ground flaxseeds. Aim to eat fish at least two times a week.  · Limit saturated fats. Saturated fats are primarily found in animal products, such as meats, butter, and cream. Plant sources of saturated fats include palm oil, palm kernel oil, and coconut oil.  · Avoid foods with partially hydrogenated oils in them. These contain trans fats. Examples of foods that contain trans fats are stick margarine, some tub margarines, cookies, crackers, and other baked goods.  WHAT GENERAL GUIDELINES DO I NEED TO FOLLOW?  These guidelines for healthy eating will help you control your intake of fat and cholesterol:  · Check food labels carefully to identify foods with trans fats or high amounts of saturated fat.  · Fill one half of your  "plate with vegetables and green salads.  · Fill one fourth of your plate with whole grains. Look for the word \"whole\" as the first word in the ingredient list.  · Fill one fourth of your plate with lean protein foods.  · Limit fruit to two servings a day. Choose fruit instead of juice.  · Eat more foods that contain soluble fiber. Examples of foods that contain this type of fiber are apples, broccoli, carrots, beans, peas, and barley. Aim to get 20-30 g of fiber per day.  · Eat more home-cooked food and less restaurant, buffet, and fast food.  · Limit or avoid alcohol.  · Limit foods high in starch and sugar.  · Limit fried foods.  · Cook foods using methods other than frying. Baking, boiling, grilling, and broiling are all great options.  · Lose weight if you are overweight. Losing just 5-10% of your initial body weight can help your overall health and prevent diseases such as diabetes and heart disease.  WHAT FOODS CAN I EAT?  Grains  Whole grains, such as whole wheat or whole grain breads, crackers, cereals, and pasta. Unsweetened oatmeal, bulgur, barley, quinoa, or brown rice. Corn or whole wheat flour tortillas.  Vegetables  Fresh or frozen vegetables (raw, steamed, roasted, or grilled). Green salads.  Fruits  All fresh, canned (in natural juice), or frozen fruits.  Meat and Other Protein Products  Ground beef (85% or leaner), grass-fed beef, or beef trimmed of fat. Skinless chicken or turkey. Ground chicken or turkey. Pork trimmed of fat. All fish and seafood. Eggs. Dried beans, peas, or lentils. Unsalted nuts or seeds. Unsalted canned or dry beans.  Dairy  Low-fat dairy products, such as skim or 1% milk, 2% or reduced-fat cheeses, low-fat ricotta or cottage cheese, or plain low-fat yogurt.  Fats and Oils  Tub margarines without trans fats. Light or reduced-fat mayonnaise and salad dressings. Avocado. Olive, canola, sesame, or safflower oils. Natural peanut or almond butter (choose ones without added sugar and " oil).  The items listed above may not be a complete list of recommended foods or beverages. Contact your dietitian for more options.  WHAT FOODS ARE NOT RECOMMENDED?  Grains  White bread. White pasta. White rice. Cornbread. Bagels, pastries, and croissants. Crackers that contain trans fat.  Vegetables  White potatoes. Corn. Creamed or fried vegetables. Vegetables in a cheese sauce.  Fruits  Dried fruits. Canned fruit in light or heavy syrup. Fruit juice.  Meat and Other Protein Products  Fatty cuts of meat. Ribs, chicken wings, pickett, sausage, bologna, salami, chitterlings, fatback, hot dogs, bratwurst, and packaged luncheon meats. Liver and organ meats.  Dairy  Whole or 2% milk, cream, half-and-half, and cream cheese. Whole milk cheeses. Whole-fat or sweetened yogurt. Full-fat cheeses. Nondairy creamers and whipped toppings. Processed cheese, cheese spreads, or cheese curds.  Sweets and Desserts  Corn syrup, sugars, honey, and molasses. Candy. Jam and jelly. Syrup. Sweetened cereals. Cookies, pies, cakes, donuts, muffins, and ice cream.  Fats and Oils  Butter, stick margarine, lard, shortening, ghee, or pickett fat. Coconut, palm kernel, or palm oils.  Beverages  Alcohol. Sweetened drinks (such as sodas, lemonade, and fruit drinks or punches).  The items listed above may not be a complete list of foods and beverages to avoid. Contact your dietitian for more information.     This information is not intended to replace advice given to you by your health care provider. Make sure you discuss any questions you have with your health care provider.     Red Yeast Rice capsules  What is this medicine?  RED YEAST RICE (red yeest rahys) is intended to be used by healthy adults to help lower blood cholesterol in conjunction with a healthy diet and a regular exercise program. The FDA has not approved this supplement for any medical use. If medical treatment is needed for cholesterol control or any other disease, you should  contact your doctor or health care professional regarding the use of this product.  This supplement may be used for other purposes; ask your health care provider or pharmacist if you have questions.  This medicine may be used for other purposes; ask your health care provider or pharmacist if you have questions.  COMMON BRAND NAME(S): Red Yeast Rice  What should I tell my health care provider before I take this medicine?  They need to know if you have any of these conditions:  -frequently drink alcoholic beverages  -kidney disease  -liver disease  -muscle aches or weakness  -other medical condition  -an unusual or allergic reaction to red yeast rice, went yeast, lovastatin, other 'statin' medications, other medicines, foods, dyes, or preservatives  -pregnant or trying to get pregnant  -breast-feeding  How should I use this medicine?  Take this supplement by mouth with a glass of water. Follow the directions on the package labeling, or take as directed by your health care professional. Do not take this supplement more often than directed.  Contact your pediatrician or health care professional regarding the use of this supplement in children. Special care may be needed. This supplement is not recommended for use in children.  Overdosage: If you think you have taken too much of this medicine contact a poison control center or emergency room at once.  NOTE: This medicine is only for you. Do not share this medicine with others.  What if I miss a dose?  If you miss a dose, take it as soon as you can. If it is almost time for your next dose, take only that dose. Do not take double or extra doses.  What may interact with this medicine?  Do not take this medicine with any of the following medications:  -clarithromycin  -delavirdine  -erythromycin  -grapefruit juice  -protease inhibitors used to treat HIV infection  -medicines for fungal infections like itraconazole, ketoconazole, posaconazole, and  voriconazole  -mibefradil  -nefazodone  -other medicines for high cholesterol  -telithromycin  -troleandomycin  This medicine may also interact with the following medications:  -alcohol  -amiodarone  -colchicine  -cyclosporine  -danazol  -diltiazem  -fenofibrate  -fluconazole  -gemfibrozil  -mifepristone, RU-486  -niacin  -Corinna's wort  -verapamil  -voriconazole  -warfarin  This list may not describe all possible interactions. Give your health care provider a list of all the medicines, herbs, non-prescription drugs, or dietary supplements you use. Also tell them if you smoke, drink alcohol, or use illegal drugs. Some items may interact with your medicine.  What should I watch for while using this medicine?  Visit your doctor or health care professional for regular check-ups. You may need regular tests to make sure your liver is working properly.  Tell you doctor or health care professional right away if you get any unexplained muscle pain, tenderness, or weakness, especially if you also have a fever and tiredness.  Some drugs may increase the risk of side effects from this supplement. If you are given certain antibiotics or antifungals, you should stop taking this supplement during those treatments. Check with your doctor or pharmacist for advice.  If you are scheduled for any medical or dental procedure, tell your healthcare provider that you are taking this supplement. You may need to stop taking this supplement before the procedure.  Do not use this drug if you are pregnant or breast-feeding. Serious side effects to an unborn child or to an infant are possible. Talk to your doctor or pharmacist for more information.  Herbal or dietary supplements are not regulated like medicines. Rigid  standards are not required for dietary supplements. The purity and strength of these products can vary. The safety and effect of this dietary supplement for a certain disease or illness is not well known. This  product is not intended to diagnose, treat, cure or prevent any disease.  The Food and Drug Administration suggests the following to help consumers protect themselves:  -Always read product labels and follow directions.  -Natural does not mean a product is safe for humans to take.  -Look for products that include USP after the ingredient name. This means that the  followed the standards of the [place] Pharmacopoeia.  -Supplements made or sold by a nationally known food or drug company are more likely to be made under tight controls. You can write to the company for more information about how the product was made.  What side effects may I notice from receiving this medicine?  Side effects that you should report to your doctor or health care professional as soon as possible:  -allergic reactions like skin rash, itching or hives, swelling of the face, lips, or tongue  -dark urine  -fever  -joint pain  -muscle cramps, pain  -redness, blistering, peeling or loosening of the skin, including inside the mouth  -trouble passing urine or change in the amount of urine  -unusually weak or tired  -yellowing of the eyes or skin  Side effects that usually do not require medical attention (report to your doctor or health care professional if they continue or are bothersome):  -constipation  -headache  -stomach gas, pain, upset  -nausea  -trouble sleeping  This list may not describe all possible side effects. Call your doctor for medical advice about side effects. You may report side effects to FDA at 5-926-FDA-1109.  Where should I keep my medicine?  Keep out of the reach of children.  Store at room temperature between 15 and 30 degrees C (59 and 86 degrees F). Throw away any unused medicine after the expiration date.  NOTE: This sheet is a summary. It may not cover all possible information. If you have questions about this medicine, talk to your doctor, pharmacist, or health care provider.  © 2018 [employer] Standard  (2015-08-25 10:26:14)    Fish Oil, Omega-3 Fatty Acids capsules (OTC)  What is this medicine?  FISH OIL, OMEGA-3 FATTY ACIDS (Fish Oil, oh MAY ga - 3 fatty AS ids) are essential fats. It is promoted to help support a healthy heart. This dietary supplement is used to add to a healthy diet. The FDA has not approved this supplement for any medical use.  This supplement may be used for other purposes; ask your health care provider or pharmacist if you have questions.  This medicine may be used for other purposes; ask your health care provider or pharmacist if you have questions.  COMMON BRAND NAME(S): Magna [place]-3, Dubizzle [place]-3 1450, Dewey Blue Omega, Dewey Blue Professional [place]-3 2100, Omega-3, Omega-3 Krill Oil, Ovega-3, Sea-Omega, TherOmega  What should I tell my health care provider before I take this medicine?  They need to know if you have any of these conditions  -bleeding problems  -lung or breathing disease, like asthma  -an unusual or allergic reaction to fish oil, omega-3 fatty acids, fish, other medicines, foods, dyes, or preservatives  -pregnant or trying to get pregnant  -breast-feeding  How should I use this medicine?  Take this medicine by mouth with a glass of water. Follow the directions on the package or prescription label. Take with food. Take your medicine at regular intervals. Do not take your medicine more often than directed.  Talk to your pediatrician regarding the use of this medicine in children. Special care may be needed. This medicine should not be used in children without a doctor's advice.  Overdosage: If you think you have taken too much of this medicine contact a poison control center or emergency room at once.  NOTE: This medicine is only for you. Do not share this medicine with others.  What if I miss a dose?  If you miss a dose, take it as soon as you can. If it is almost time for your next dose, take only that dose. Do not take double or extra doses.  What may  interact with this medicine?  -aspirin and aspirin-like medicines  -herbal products like danshen, dong quai, garlic pills, beronica, ginkgo biloba, horse chestnut, willow bark, and others  -medicines that treat or prevent blood clots like enoxaparin, heparin, warfarin  This list may not describe all possible interactions. Give your health care provider a list of all the medicines, herbs, non-prescription drugs, or dietary supplements you use. Also tell them if you smoke, drink alcohol, or use illegal drugs. Some items may interact with your medicine.  What should I watch for while using this medicine?  Follow a good diet and exercise plan. Taking a dietary supplement does not replace a healthy lifestyle. Some foods that have omega-3 fatty acids naturally are fatty fish like albacore tuna, halibut, herring, mackerel, lake trout, salmon, and sardines.  Too much of this supplement can be unsafe. Talk to your doctor or health care provider about how much of this supplement is right for you.  If you are scheduled for any medical or dental procedure, tell your healthcare provider that you are taking this medicine. You may need to stop taking this medicine before the procedure.  Herbal or dietary supplements are not regulated like medicines. Rigid  standards are not required for dietary supplements. The purity and strength of these products can vary. The safety and effect of this dietary supplement for a certain disease or illness is not well known. This product is not intended to diagnose, treat, cure or prevent any disease.  The Food and Drug Administration suggests the following to help consumers protect themselves:  -Always read product labels and follow directions.  -Natural does not mean a product is safe for humans to take.  -Look for products that include USP after the ingredient name. This means that the  followed the standards of the [place] Pharmacopoeia.  -Supplements made or sold by a  nationally known food or drug company are more likely to be made under tight controls. You can write to the company for more information about how the product was made.  What side effects may I notice from receiving this medicine?  Side effects that you should report to your doctor or health care professional as soon as possible:  -allergic reactions like skin rash, itching or hives, swelling of the face, lips, or tongue  -breathing problems  -changes in your moods or emotions  -unusual bleeding or bruising  Side effects that usually do not require medical attention (report to your doctor or health care professional if they continue or are bothersome):  -bad or fishy breath  -belching  -diarrhea  -nausea  -stomach gas, upset  -weight gain  This list may not describe all possible side effects. Call your doctor for medical advice about side effects. You may report side effects to FDA at 3-612-FDA-5749.  Where should I keep my medicine?  Keep out of the reach of children.  Store at room temperature or as directed on the package label. Protect from moisture. Do not freeze. Throw away any unused medicine after the expiration date.  NOTE: This sheet is a summary. It may not cover all possible information. If you have questions about this medicine, talk to your doctor, pharmacist, or health care provider.  © 2018 Elsevier/Gold Standard (2016-04-07 09:36:32)    Back Exercises  Back exercises help treat and prevent back injuries. The goal of back exercises is to increase the strength of your abdominal and back muscles and the flexibility of your back. These exercises should be started when you no longer have back pain. Back exercises include:  · Pelvic Tilt. Lie on your back with your knees bent. Tilt your pelvis until the lower part of your back is against the floor. Hold this position 5 to 10 sec and repeat 5 to 10 times.  · Knee to Chest. Pull first 1 knee up against your chest and hold for 20 to 30 seconds, repeat this  with the other knee, and then both knees. This may be done with the other leg straight or bent, whichever feels better.  · Sit-Ups or Curl-Ups. Bend your knees 90 degrees. Start with tilting your pelvis, and do a partial, slow sit-up, lifting your trunk only 30 to 45 degrees off the floor. Take at least 2 to 3 seconds for each sit-up. Do not do sit-ups with your knees out straight. If partial sit-ups are difficult, simply do the above but with only tightening your abdominal muscles and holding it as directed.  · Hip-Lift. Lie on your back with your knees flexed 90 degrees. Push down with your feet and shoulders as you raise your hips a couple inches off the floor; hold for 10 seconds, repeat 5 to 10 times.  · Back arches. Lie on your stomach, propping yourself up on bent elbows. Slowly press on your hands, causing an arch in your low back. Repeat 3 to 5 times. Any initial stiffness and discomfort should lessen with repetition over time.  · Shoulder-Lifts. Lie face down with arms beside your body. Keep hips and torso pressed to floor as you slowly lift your head and shoulders off the floor.  Do not overdo your exercises, especially in the beginning. Exercises may cause you some mild back discomfort which lasts for a few minutes; however, if the pain is more severe, or lasts for more than 15 minutes, do not continue exercises until you see your caregiver. Improvement with exercise therapy for back problems is slow.   See your caregivers for assistance with developing a proper back exercise program.     This information is not intended to replace advice given to you by your health care provider. Make sure you discuss any questions you have with your health care provider.     Document Released: 01/25/2006 Document Revised: 03/11/2013 Document Reviewed: 02/11/2016  Elsevier Interactive Patient Education ©2016 [employer]

## 2019-06-17 ENCOUNTER — HOSPITAL ENCOUNTER (OUTPATIENT)
Dept: RADIOLOGY | Facility: MEDICAL CENTER | Age: 59
End: 2019-06-17
Attending: FAMILY MEDICINE
Payer: COMMERCIAL

## 2019-06-17 DIAGNOSIS — Z51.81 MEDICATION MONITORING ENCOUNTER: ICD-10-CM

## 2019-06-17 DIAGNOSIS — R79.89 ELEVATED LFTS: ICD-10-CM

## 2019-06-17 PROCEDURE — 76705 ECHO EXAM OF ABDOMEN: CPT

## 2019-07-11 ENCOUNTER — HOSPITAL ENCOUNTER (OUTPATIENT)
Dept: LAB | Facility: MEDICAL CENTER | Age: 59
End: 2019-07-11
Attending: FAMILY MEDICINE
Payer: COMMERCIAL

## 2019-07-11 DIAGNOSIS — Z51.81 MEDICATION MONITORING ENCOUNTER: ICD-10-CM

## 2019-07-11 DIAGNOSIS — E53.8 VITAMIN B12 DEFICIENCY: ICD-10-CM

## 2019-07-11 DIAGNOSIS — R73.9 HYPERGLYCEMIA: ICD-10-CM

## 2019-07-11 DIAGNOSIS — R79.89 ELEVATED LFTS: ICD-10-CM

## 2019-07-11 LAB
EST. AVERAGE GLUCOSE BLD GHB EST-MCNC: 114 MG/DL
FERRITIN SERPL-MCNC: 418 NG/ML (ref 10–291)
HAV IGM SERPL QL IA: NEGATIVE
HBA1C MFR BLD: 5.6 % (ref 0–5.6)
HBV CORE IGM SER QL: NEGATIVE
HBV SURFACE AG SER QL: NEGATIVE
HCV AB SER QL: NEGATIVE
INR PPP: 0.93 (ref 0.87–1.13)
IRON SATN MFR SERPL: 28 % (ref 15–55)
IRON SERPL-MCNC: 112 UG/DL (ref 40–170)
PROTHROMBIN TIME: 12.7 SEC (ref 12–14.6)
TIBC SERPL-MCNC: 393 UG/DL (ref 250–450)
VIT B12 SERPL-MCNC: 277 PG/ML (ref 211–911)

## 2019-07-11 PROCEDURE — 82390 ASSAY OF CERULOPLASMIN: CPT

## 2019-07-11 PROCEDURE — 83540 ASSAY OF IRON: CPT

## 2019-07-11 PROCEDURE — 82105 ALPHA-FETOPROTEIN SERUM: CPT

## 2019-07-11 PROCEDURE — 82728 ASSAY OF FERRITIN: CPT

## 2019-07-11 PROCEDURE — 82607 VITAMIN B-12: CPT

## 2019-07-11 PROCEDURE — 83550 IRON BINDING TEST: CPT

## 2019-07-11 PROCEDURE — 85610 PROTHROMBIN TIME: CPT

## 2019-07-11 PROCEDURE — 36415 COLL VENOUS BLD VENIPUNCTURE: CPT

## 2019-07-11 PROCEDURE — 83036 HEMOGLOBIN GLYCOSYLATED A1C: CPT

## 2019-07-11 PROCEDURE — 80074 ACUTE HEPATITIS PANEL: CPT

## 2019-07-12 LAB
AFP-TM SERPL-MCNC: 8 NG/ML (ref 0–9)
CERULOPLASMIN SERPL-MCNC: 29 MG/DL (ref 17–54)

## 2019-07-15 ENCOUNTER — TELEPHONE (OUTPATIENT)
Dept: MEDICAL GROUP | Facility: PHYSICIAN GROUP | Age: 59
End: 2019-07-15

## 2019-07-15 NOTE — TELEPHONE ENCOUNTER
1. Caller Name: Trang      Call Back Number: [phone] (home)         Patient approves a detailed voicemail message: N\A    Called and talked with pt. She already saw the message from . Told pt to call if she needs anything further.

## 2019-07-15 NOTE — TELEPHONE ENCOUNTER
----- Message from Naomi Alcala M.D. sent at 7/14/2019  9:14 PM PDT -----  Please call patient and let patient know a few abnormals in labs but nothing urgent and can start taking Vit B12 over the counter 500 mcg as we like that level above 500 and we can discuss all labs in person at the follow up appointment on 8/5/19, thank you.

## 2019-07-18 DIAGNOSIS — K21.9 GASTROESOPHAGEAL REFLUX DISEASE, ESOPHAGITIS PRESENCE NOT SPECIFIED: ICD-10-CM

## 2019-07-18 RX ORDER — OMEPRAZOLE 40 MG/1
CAPSULE, DELAYED RELEASE ORAL
Qty: 90 CAP | Refills: 0 | Status: SHIPPED | OUTPATIENT
Start: 2019-07-18 | End: 2019-09-03 | Stop reason: SDUPTHER

## 2019-09-03 ENCOUNTER — OFFICE VISIT (OUTPATIENT)
Dept: MEDICAL GROUP | Facility: PHYSICIAN GROUP | Age: 59
End: 2019-09-03
Payer: COMMERCIAL

## 2019-09-03 VITALS
WEIGHT: 177 LBS | BODY MASS INDEX: 26.83 KG/M2 | SYSTOLIC BLOOD PRESSURE: 138 MMHG | DIASTOLIC BLOOD PRESSURE: 88 MMHG | TEMPERATURE: 98 F | HEART RATE: 64 BPM | RESPIRATION RATE: 14 BRPM | OXYGEN SATURATION: 97 % | HEIGHT: 68 IN

## 2019-09-03 DIAGNOSIS — R92.2 DENSE BREAST: ICD-10-CM

## 2019-09-03 DIAGNOSIS — E53.8 VITAMIN B12 DEFICIENCY: ICD-10-CM

## 2019-09-03 DIAGNOSIS — G47.09 OTHER INSOMNIA: ICD-10-CM

## 2019-09-03 DIAGNOSIS — K44.9 HIATAL HERNIA: ICD-10-CM

## 2019-09-03 DIAGNOSIS — R79.89 HIGH SERUM FERRITIN: ICD-10-CM

## 2019-09-03 DIAGNOSIS — E78.00 PURE HYPERCHOLESTEROLEMIA: ICD-10-CM

## 2019-09-03 DIAGNOSIS — I10 ESSENTIAL HYPERTENSION: ICD-10-CM

## 2019-09-03 DIAGNOSIS — Z78.9 ALCOHOL USE: ICD-10-CM

## 2019-09-03 DIAGNOSIS — K21.9 GASTROESOPHAGEAL REFLUX DISEASE, ESOPHAGITIS PRESENCE NOT SPECIFIED: ICD-10-CM

## 2019-09-03 DIAGNOSIS — R79.89 ELEVATED LFTS: ICD-10-CM

## 2019-09-03 DIAGNOSIS — R92.30 DENSE BREAST: ICD-10-CM

## 2019-09-03 DIAGNOSIS — K76.0 FATTY LIVER: ICD-10-CM

## 2019-09-03 DIAGNOSIS — R79.89 HIGH SERUM VITAMIN D: ICD-10-CM

## 2019-09-03 DIAGNOSIS — Z51.81 MEDICATION MONITORING ENCOUNTER: ICD-10-CM

## 2019-09-03 PROCEDURE — 99214 OFFICE O/P EST MOD 30 MIN: CPT | Performed by: FAMILY MEDICINE

## 2019-09-03 RX ORDER — UBIDECARENONE 75 MG
100 CAPSULE ORAL DAILY
COMMUNITY
End: 2019-09-03

## 2019-09-03 RX ORDER — LISINOPRIL 20 MG/1
20 TABLET ORAL
Qty: 90 TAB | Refills: 0 | Status: SHIPPED | OUTPATIENT
Start: 2019-09-03 | End: 2019-10-23

## 2019-09-03 RX ORDER — OMEPRAZOLE 40 MG/1
40 CAPSULE, DELAYED RELEASE ORAL
Qty: 90 CAP | Refills: 0 | Status: SHIPPED | OUTPATIENT
Start: 2019-09-03 | End: 2019-10-23

## 2019-09-03 RX ORDER — CHOLECALCIFEROL (VITAMIN D3) 125 MCG
500 CAPSULE ORAL DAILY
Qty: 90 TAB | Refills: 3 | Status: SHIPPED | OUTPATIENT
Start: 2019-09-03

## 2019-09-03 RX ORDER — CHLORAL HYDRATE 500 MG
1000 CAPSULE ORAL
COMMUNITY

## 2019-09-03 SDOH — HEALTH STABILITY: MENTAL HEALTH: HOW MANY STANDARD DRINKS CONTAINING ALCOHOL DO YOU HAVE ON A TYPICAL DAY?: 1 OR 2

## 2019-09-03 SDOH — HEALTH STABILITY: MENTAL HEALTH: HOW OFTEN DO YOU HAVE A DRINK CONTAINING ALCOHOL?: 2-3 TIMES A WEEK

## 2019-09-03 NOTE — PATIENT INSTRUCTIONS
Diagnoses and all orders for this visit:    Hiatal hernia    Elevated LFTs  -     REFERRAL TO GASTROENTEROLOGY  -     Comp Metabolic Panel; Future    Pure hypercholesterolemia  -     REFERRAL TO GASTROENTEROLOGY    High serum ferritin  -     REFERRAL TO GASTROENTEROLOGY  -     FERRITIN; Future    Vitamin B12 deficiency  -     cyanocobalamin (VITAMIN B-12) 500 MCG Tab; Take 1 Tab by mouth every day.    Other insomnia    Dense breast    Fatty liver  -     REFERRAL TO GASTROENTEROLOGY    Alcohol use  -     REFERRAL TO GASTROENTEROLOGY    Medication monitoring encounter  -     Comp Metabolic Panel; Future  -     FERRITIN; Future  -     VITAMIN D,25 HYDROXY; Future    High serum vitamin D  -     VITAMIN D,25 HYDROXY; Future    Essential hypertension  -     lisinopril (PRINIVIL) 20 MG Tab; Take 1 Tab by mouth every day.    Gastroesophageal reflux disease, esophagitis presence not specified  -     omeprazole (PRILOSEC) 40 MG delayed-release capsule; Take 1 Cap by mouth every day.    -July 11 lab work showed hepatitis panel negative, iron panel was done which was within normal limits, ferritin is mildly elevated at 418.  A1c screening 5.6.  Vitamin B12 is low at 277.  AFP 8.  PT/INR within normal limits.  Ceruloplasmin within normal limits.  May liver function testing was high.  May 2018 ALT was 188 and  and ALT did decrease to 111 and AST to 64.  Went over liver ultrasound which did show concern for fatty liver and LFTs are still elevated so we will recheck some lab work and please get fasting lab work 8 hours of no eating but drink plenty of water 1 week before you see me in 8 weeks.  We will also resend you to gastroenterology for evaluation of the fatty liver.  Please until then avoid anything that will insult the liver including Tylenol, and alcohol.  She only drinks on the weekends some wine but she will try to reduce this.  She did get a newly diagnosis of hiatal hernia from the GI through endoscopy.  She will  continue her omeprazole 40 mg for now and we could consider reducing to 20 mg in the future.  She will continue lisinopril 20 mg and recheck her blood pressure twice a week along with her heart rate and bring the records of this at her next visit.  She will take vitamin B12 500 mcg over-the-counter daily and we will try to send prescription.  We will recheck her vitamin D as this was too high.  We will recheck her ferritin and she is not taking any iron and I would suggest her not to.  Her cholesterol was high and we can consider starting her on cholesterol medications but due to her liver enzymes being high we will refrain for now and she is doing omega-3 fatty acid fish oil and red yeast and to do this twice a day with meals.  We will discuss at her next appointment about her MS and consider possible physical therapy or any other help that she does require.  If any intense abdominal pain, jaundice, vomiting then please go to the ER call 911 otherwise we will recheck her liver test and send her to the GI she will work on her blood pressure and cutting down the alcohol and for the insomnia she could try valerian root as melatonin gave her skin reaction and she could try this half an hour before bed.  And we will see how she does with all of this at her follow-up in 8 weeks.    Return in about 8 weeks (around 10/29/2019), or BP/lab FU/MS/PT?/fatty liver.      Hiatal Hernia  A hiatal hernia occurs when part of your stomach slides above the muscle that separates your abdomen from your chest (diaphragm). You can be born with a hiatal hernia (congenital), or it may develop over time. In almost all cases of hiatal hernia, only the top part of the stomach pushes through.   Many people have a hiatal hernia with no symptoms. The larger the hernia, the more likely that you will have symptoms. In some cases, a hiatal hernia allows stomach acid to flow back into the tube that carries food from your mouth to your stomach  (esophagus). This may cause heartburn symptoms. Severe heartburn symptoms may mean you have developed a condition called gastroesophageal reflux disease (GERD).   CAUSES   Hiatal hernias are caused by a weakness in the opening (hiatus) where your esophagus passes through your diaphragm to attach to the upper part of your stomach. You may be born with a weakness in your hiatus, or a weakness can develop.  RISK FACTORS  Older age is a major risk factor for a hiatal hernia. Anything that increases pressure on your diaphragm can also increase your risk of a hiatal hernia. This includes:  · Pregnancy.  · Excess weight.  · Frequent constipation.  SIGNS AND SYMPTOMS   People with a hiatal hernia often have no symptoms. If symptoms develop, they are almost always caused by GERD. They may include:  · Heartburn.  · Belching.  · Indigestion.  · Trouble swallowing.  · Coughing or wheezing.   · Sore throat.  · Hoarseness.  · Chest pain.  DIAGNOSIS   A hiatal hernia is sometimes found during an exam for another problem. Your health care provider may suspect a hiatal hernia if you have symptoms of GERD. Tests may be done to diagnose GERD. These may include:  · X-rays of your stomach or chest.  · An upper gastrointestinal (GI) series. This is an X-ray exam of your GI tract involving the use of a chalky liquid that you swallow. The liquid shows up clearly on the X-ray.  · Endoscopy. This is a procedure to look into your stomach using a thin, flexible tube that has a tiny camera and light on the end of it.  TREATMENT   If you have no symptoms, you may not need treatment. If you have symptoms, treatment may include:  · Dietary and lifestyle changes to help reduce GERD symptoms.  · Medicines. These may include:  ¨ Over-the-counter antacids.  ¨ Medicines that make your stomach empty more quickly.  ¨ Medicines that block the production of stomach acid (H2 blockers).  ¨ Stronger medicines to reduce stomach acid (proton pump  inhibitors).  · You may need surgery to repair the hernia if other treatments are not helping.  HOME CARE INSTRUCTIONS   · Take all medicines as directed by your health care provider.  · Quit smoking, if you smoke.  · Try to achieve and maintain a healthy body weight.  · Eat frequent small meals instead of three large meals a day. This keeps your stomach from getting too full.  ¨ Eat slowly.  ¨ Do not lie down right after eating.   ¨ Do not eat 1-2 hours before bed.    · Do not drink beverages with caffeine. These include cola, coffee, cocoa, and tea.  · Do not drink alcohol.  · Avoid foods that can make symptoms of GERD worse. These may include:  ¨ Fatty foods.  ¨ Citrus fruits.  ¨ Other foods and drinks that contain acid.  · Avoid putting pressure on your belly. Anything that puts pressure on your belly increases the amount of acid that may be pushed up into your esophagus.    ¨ Avoid bending over, especially after eating.  ¨ Raise the head of your bed by putting blocks under the legs. This keeps your head and esophagus higher than your stomach.  ¨ Do not wear tight clothing around your chest or stomach.  ¨ Try not to strain when having a bowel movement, when urinating, or when lifting heavy objects.  SEEK MEDICAL CARE IF:  · Your symptoms are not controlled with medicines or lifestyle changes.  · You are having trouble swallowing.  · You have coughing or wheezing that will not go away.  SEEK IMMEDIATE MEDICAL CARE IF:  · Your pain is getting worse.  · Your pain spreads to your arms, neck, jaw, teeth, or back.  · You have shortness of breath.  · You sweat for no reason.  · You feel sick to your stomach (nauseous) or vomit.  · You vomit blood.  · You have bright red blood in your stools.  · You have black, tarry stools.    This information is not intended to replace advice given to you by your health care provider. Make sure you discuss any questions you have with your health care provider.    Brenda Wilburn  officinalis oral dosage forms  What is this medicine?  VALERIAN (vuh LEER ee uhn) is an herbal or dietary supplement. It is promoted to help relaxation, sleep and stress. The FDA has not approved this supplement for any medical use.  This supplement may be used for other purposes; ask your health care provider or pharmacist if you have questions.  This medicine may be used for other purposes; ask your health care provider or pharmacist if you have questions.  What should I tell my health care provider before I take this medicine?  They need to know if you have any of these conditions:  -drug abuse or addiction  -emotional illness like anxiety, depression  -heart disease  -kidney disease  -liver disease  -if you frequently drink alcohol containing drinks  -sleeping problems  -an unusual or allergic reaction to valerian, herbs, plants, other medicines, foods, dyes, or preservatives  -pregnant or trying to get pregnant  -breast-feeding  How should I use this medicine?  Take this supplement by mouth with a glass of water. Follow the directions on the package labeling, or take as directed by your health care professional. If this supplement upsets your stomach, take it with food. Do not take this supplement more often than directed.  Contact your pediatrician regarding the use of this supplement in children. Special care may be needed.  Overdosage: If you think you have taken too much of this medicine contact a poison control center or emergency room at once.  NOTE: This medicine is only for you. Do not share this medicine with others.  What if I miss a dose?  If you miss a dose, take it as soon as you can. If it is almost time for your next dose, take only that dose. Do not take double or extra doses.  What may interact with this medicine?  Check with your doctor or healthcare professional if you are taking any of the following medications:  -alcohol  -barbiturate medicines for sleep or seizures  -medicines for  depression, anxiety, or psychotic disturbances  -medicines for sleep  -muscle relaxants  -narcotic pain medicines  This list may not describe all possible interactions. Give your health care provider a list of all the medicines, herbs, non-prescription drugs, or dietary supplements you use. Also tell them if you smoke, drink alcohol, or use illegal drugs. Some items may interact with your medicine.  What should I watch for while using this medicine?  See your doctor if your symptoms do not get better or if they get worse.  You may get drowsy or dizzy. Do not drive, use machinery, or do anything that needs mental alertness until you know how this medicine affects you. Do not stand or sit up quickly, especially if you are an older patient. This reduces the risk of dizzy or fainting spells. Alcohol may interfere with the effect of this medicine.  If you are scheduled for any medical or dental procedure, tell your healthcare provider that you are taking this supplement. You may need to stop taking this supplement before the procedure.  Herbal or dietary supplements are not regulated like medicines. Rigid  standards are not required for dietary supplements. The purity and strength of these products can vary. The safety and effect of this dietary supplement for a certain disease or illness is not well known. This product is not intended to diagnose, treat, cure or prevent any disease.  The Food and Drug Administration suggests the following to help consumers protect themselves:  -Always read product labels and follow directions.  -Natural does not mean a product is safe for humans to take.  -Look for products that include USP after the ingredient name. This means that the  followed the standards of the [place] Pharmacopoeia.  -Supplements made or sold by a nationally known food or drug company are more likely to be made under tight controls. You can write to the company for more information about how  the product was made.  What side effects may I notice from receiving this medicine?  Side effects that you should report to your doctor or health care professional as soon as possible:  -allergic reactions like skin rash, itching or hives, swelling of the face, lips, or tongue  -breathing problems  -changes in emotions or moods, like depressed mood  -confused, forgetful  -dark urine  -fast, irregular heartbeat  -problems with balance, talking, walking  -unusually weak or tired  -yellowing of the eyes, skin  Side effects that usually do not require medical attention (report to your doctor or health care professional if they continue or are bothersome):  -stomach upset  -dizziness  -tiredness  This list may not describe all possible side effects. Call your doctor for medical advice about side effects. You may report side effects to FDA at 5-413-FDA-0293.  Where should I keep my medicine?  Keep out of the reach of children.  Store at room temperature or as directed on the package label. Protect from moisture. Throw away any unused supplement after the expiration date.  NOTE: This sheet is a summary. It may not cover all possible information. If you have questions about this medicine, talk to your doctor, pharmacist, or health care provider.  © 2018 [employer]vier/Gold Standard (2009-09-14 17:33:52)      Fatty Liver  Introduction  Fatty liver, also called hepatic steatosis or steatohepatitis, is a condition in which too much fat has built up in your liver cells. The liver removes harmful substances from your bloodstream. It produces fluids your body needs. It also helps your body use and store energy from the food you eat.  In many cases, fatty liver does not cause symptoms or problems. It is often diagnosed when tests are being done for other reasons. However, over time, fatty liver can cause inflammation that may lead to more serious liver problems, such as scarring of the liver (cirrhosis).  What are the causes?  Causes of  fatty liver may include:  · Drinking too much alcohol.  · Poor nutrition.  · Obesity.  · Cushing syndrome.  · Diabetes.  · Hyperlipidemia.  · Pregnancy.  · Certain drugs.  · Poisons.  · Some viral infections.  What increases the risk?  You may be more likely to develop fatty liver if you:  · Abuse alcohol.  · Are pregnant.  · Are overweight.  · Have diabetes.  · Have hepatitis.  · Have a high triglyceride level.  What are the signs or symptoms?  Fatty liver often does not cause any symptoms. In cases where symptoms develop, they can include:  · Fatigue.  · Weakness.  · Weight loss.  · Confusion.  · Abdominal pain.  · Yellowing of your skin and the white parts of your eyes (jaundice).  · Nausea and vomiting.  How is this diagnosed?  Fatty liver may be diagnosed by:  · Physical exam and medical history.  · Blood tests.  · Imaging tests, such as an ultrasound, CT scan, or MRI.  · Liver biopsy. A small sample of liver tissue is removed using a needle. The sample is then looked at under a microscope.  How is this treated?  Fatty liver is often caused by other health conditions. Treatment for fatty liver may involve medicines and lifestyle changes to manage conditions such as:  · Alcoholism.  · High cholesterol.  · Diabetes.  · Being overweight or obese.  Follow these instructions at home:  · Eat a healthy diet as directed by your health care provider.  · Exercise regularly. This can help you lose weight and control your cholesterol and diabetes. Talk to your health care provider about an exercise plan and which activities are best for you.  · Do not drink alcohol.  · Take medicines only as directed by your health care provider.  Contact a health care provider if:  You have difficulty controlling your:  · Blood sugar.  · Cholesterol.  · Alcohol consumption.  Get help right away if:  · You have abdominal pain.  · You have jaundice.  · You have nausea and vomiting.  This information is not intended to replace advice given  to you by your health care provider. Make sure you discuss any questions you have with your health care provider.  Document Released: 02/02/2007 Document Revised: 05/25/2017 Document Reviewed: 04/29/2015  © 2017 Elsevier

## 2019-09-03 NOTE — PROGRESS NOTES
cc: Vitamin B12 deficiency, hypertension, increased ferritin, increased LFT,     Subjective:     Trang Moses is a 59 y.o. female presenting she has been checking her blood pressures and her lowest blood pressure reading was 98/74 and highest was 136/76 but average has been 110s over 80s.  She denies any chest pain or problems breathing.  She does walk and exercise and when she does that she is not having any reduced exercise tolerance or chest pain or problems breathing.  She did see the GI doctor and got her endoscopy done and was told she had a hiatal hernia but no other polyps or changes of the esophageal lining or concerns for cancer.  She did do her lab work as well and ultrasound.  July 11 lab work showed hepatitis panel negative, iron panel was done which was within normal limits, ferritin is mildly elevated at 418.  A1c screening 5.6.  Vitamin B12 is low at 277.  AFP 8.  PT/INR within normal limits.  Ceruloplasmin within normal limits.  May liver function testing was high.  May 2018 ALT was 188 and  and ALT did decrease to 111 and AST to 64.  She is doing red yeast and fish oil once a day.  May 2019 she did do her mammogram which showed dense breasts but no signs of cancer and they recommend once year screening.  June 17th got her right upper ultrasound done which was showing a normal-sized liver with increased echogenicity and had atrial genius most likely a fatty liver and due to the echogenicity limited evaluation for liver masses.  The gallbladder has been removed.  Pancreas appears normal.  Aorta appears normal.  Pedal portal vein flow appears normal and kidneys appear normal and no ascites or fluid seen.  She is never been told that she had fatty liver before.  She does drink wine on the weekends but not on a daily basis and no binge drinking.  She does report having binge drink in the past with cocktails.  He does have multiple sclerosis and was on high-dose steroids long time ago in the past  "but never on any biologicals or other MS medications.    Review of systems:     Constitutional: Negative for fever, chills and negative fatigue.   HENT: Negative for sinus pressure  Eyes: Negative for blurriness, negative for double vision  Respiratory: Negative for cough and shortness of breath, negative for exertional shortness of breath  Cardiovascular: Negative for leg swelling, negative for palpitations, negative for chest pain  Gastrointestinal: Negative for nausea, vomiting, abdominal pain, constipation and diarrhea.  Genitourinary: Negative for dysuria and hematuria.   Skin: Negative for rash.   Neurological: Negative for dizziness, focal weakness and headaches.   Endo/Heme/Allergies: Denies bleeding, bruising, and recurrent allergies.  Psychiatric/Behavioral: Negative for depression and anxiety.      Current Outpatient Medications:   •  Omega-3 Fatty Acids (FISH OIL) 1000 MG Cap capsule, Take 1,000 mg by mouth 3 times a day, with meals., Disp: , Rfl:   •  coenzyme Q-10 30 MG capsule, Take 60 mg by mouth every day., Disp: , Rfl:   •  RED YEAST RICE EXTRACT PO, Take  by mouth., Disp: , Rfl:   •  cyanocobalamin (VITAMIN B-12) 500 MCG Tab, Take 1 Tab by mouth every day., Disp: 90 Tab, Rfl: 3  •  lisinopril (PRINIVIL) 20 MG Tab, Take 1 Tab by mouth every day., Disp: 90 Tab, Rfl: 0  •  omeprazole (PRILOSEC) 40 MG delayed-release capsule, Take 1 Cap by mouth every day., Disp: 90 Cap, Rfl: 0  •  therapeutic multivitamin-minerals (THERAGRAN-M) Tab, Take 1 Tab by mouth every day., Disp: , Rfl:     Allergies, past medical history, past surgical history, family history, social history reviewed and updated    Objective:     Vitals: /88 (BP Location: Left arm, Patient Position: Sitting, BP Cuff Size: Adult)   Pulse 64   Temp 36.7 °C (98 °F) (Temporal)   Resp 14   Ht 1.727 m (5' 8\")   Wt 80.3 kg (177 lb)   SpO2 97%   BMI 26.91 kg/m²   General: Alert, pleasant, NAD  HEENT: Normocephalic.  Nontraumatic. EOMI, " no icterus or pallor.  Conjunctivae and lids normal. External ears normal.  Heart: Regular rate and rhythm.  S1 and S2 normal.  No murmurs appreciated.  Respiratory: Normal respiratory effort.  Clear to auscultation bilaterally.  Skin: Warm, dry, no rashes.  Musculoskeletal: Gait is normal.  Moves all extremities well.  Extremities: No leg edema.   Psych:  Affect/mood is normal, judgement is good, memory is intact, grooming is appropriate.    Assessment/Plan:     Diagnoses and all orders for this visit:    Hiatal hernia    Elevated LFTs  -     REFERRAL TO GASTROENTEROLOGY  -     Comp Metabolic Panel; Future    Pure hypercholesterolemia  -     REFERRAL TO GASTROENTEROLOGY    High serum ferritin  -     REFERRAL TO GASTROENTEROLOGY  -     FERRITIN; Future    Vitamin B12 deficiency  -     cyanocobalamin (VITAMIN B-12) 500 MCG Tab; Take 1 Tab by mouth every day.    Other insomnia    Dense breast    Fatty liver  -     REFERRAL TO GASTROENTEROLOGY    Alcohol use  -     REFERRAL TO GASTROENTEROLOGY    Medication monitoring encounter  -     Comp Metabolic Panel; Future  -     FERRITIN; Future  -     VITAMIN D,25 HYDROXY; Future    High serum vitamin D  -     VITAMIN D,25 HYDROXY; Future    Essential hypertension  -     lisinopril (PRINIVIL) 20 MG Tab; Take 1 Tab by mouth every day.    Gastroesophageal reflux disease, esophagitis presence not specified  -     omeprazole (PRILOSEC) 40 MG delayed-release capsule; Take 1 Cap by mouth every day.    -July 11 lab work showed hepatitis panel negative, iron panel was done which was within normal limits, ferritin is mildly elevated at 418.  A1c screening 5.6.  Vitamin B12 is low at 277.  AFP 8.  PT/INR within normal limits.  Ceruloplasmin within normal limits.  May liver function testing was high.  May 2018 ALT was 188 and  and ALT did decrease to 111 and AST to 64.  Went over liver ultrasound which did show concern for fatty liver and LFTs are still elevated so we will recheck  some lab work and please get fasting lab work 8 hours of no eating but drink plenty of water 1 week before you see me in 8 weeks.  We will also resend you to gastroenterology for evaluation of the fatty liver.  Please until then avoid anything that will insult the liver including Tylenol, and alcohol.  She only drinks on the weekends some wine but she will try to reduce this.  She did get a newly diagnosis of hiatal hernia from the GI through endoscopy.  She will continue her omeprazole 40 mg for now and we could consider reducing to 20 mg in the future.  She will continue lisinopril 20 mg and recheck her blood pressure twice a week along with her heart rate and bring the records of this at her next visit.  She will take vitamin B12 500 mcg over-the-counter daily and we will try to send prescription.  We will recheck her vitamin D as this was too high.  We will recheck her ferritin and she is not taking any iron and I would suggest her not to.  Her cholesterol was high and we can consider starting her on cholesterol medications but due to her liver enzymes being high we will refrain for now and she is doing omega-3 fatty acid fish oil and red yeast and to do this twice a day with meals.  We will discuss at her next appointment about her MS and consider possible physical therapy or any other help that she does require.  If any intense abdominal pain, jaundice, vomiting then please go to the ER call [phone] otherwise we will recheck her liver test and send her to the GI she will work on her blood pressure and cutting down the alcohol and for the insomnia she could try valerian root as melatonin gave her skin reaction and she could try this half an hour before bed.  And we will see how she does with all of this at her follow-up in 8 weeks.    Return in about 8 weeks (around 10/29/2019), or BP/lab FU/MS/PT?/fatty liver.

## 2019-09-10 ENCOUNTER — PATIENT MESSAGE (OUTPATIENT)
Dept: MEDICAL GROUP | Facility: PHYSICIAN GROUP | Age: 59
End: 2019-09-10

## 2019-09-10 NOTE — PATIENT COMMUNICATION
Please get lab work done and we could decide this at the follow-up appointment as we can wait until then.  Thanks.

## 2019-10-03 ENCOUNTER — HOSPITAL ENCOUNTER (OUTPATIENT)
Dept: LAB | Facility: MEDICAL CENTER | Age: 59
End: 2019-10-03
Attending: FAMILY MEDICINE
Payer: COMMERCIAL

## 2019-10-03 ENCOUNTER — HOSPITAL ENCOUNTER (OUTPATIENT)
Dept: LAB | Facility: MEDICAL CENTER | Age: 59
End: 2019-10-03
Attending: INTERNAL MEDICINE
Payer: COMMERCIAL

## 2019-10-03 DIAGNOSIS — R79.89 ELEVATED LFTS: ICD-10-CM

## 2019-10-03 DIAGNOSIS — R79.89 HIGH SERUM FERRITIN: ICD-10-CM

## 2019-10-03 DIAGNOSIS — R79.89 HIGH SERUM VITAMIN D: ICD-10-CM

## 2019-10-03 DIAGNOSIS — Z51.81 MEDICATION MONITORING ENCOUNTER: ICD-10-CM

## 2019-10-03 LAB
25(OH)D3 SERPL-MCNC: 34 NG/ML (ref 30–100)
ALBUMIN SERPL BCP-MCNC: 4.8 G/DL (ref 3.2–4.9)
ALBUMIN SERPL BCP-MCNC: 4.8 G/DL (ref 3.2–4.9)
ALBUMIN/GLOB SERPL: 1.6 G/DL
ALP SERPL-CCNC: 68 U/L (ref 30–99)
ALP SERPL-CCNC: 68 U/L (ref 30–99)
ALT SERPL-CCNC: 42 U/L (ref 2–50)
ALT SERPL-CCNC: 44 U/L (ref 2–50)
ANION GAP SERPL CALC-SCNC: 10 MMOL/L (ref 0–11.9)
AST SERPL-CCNC: 30 U/L (ref 12–45)
AST SERPL-CCNC: 30 U/L (ref 12–45)
BILIRUB CONJ SERPL-MCNC: 0.2 MG/DL (ref 0.1–0.5)
BILIRUB INDIRECT SERPL-MCNC: 1.4 MG/DL (ref 0–1)
BILIRUB SERPL-MCNC: 1.6 MG/DL (ref 0.1–1.5)
BILIRUB SERPL-MCNC: 1.6 MG/DL (ref 0.1–1.5)
BUN SERPL-MCNC: 26 MG/DL (ref 8–22)
CALCIUM SERPL-MCNC: 9.9 MG/DL (ref 8.5–10.5)
CHLORIDE SERPL-SCNC: 102 MMOL/L (ref 96–112)
CO2 SERPL-SCNC: 24 MMOL/L (ref 20–33)
CREAT SERPL-MCNC: 0.88 MG/DL (ref 0.5–1.4)
FERRITIN SERPL-MCNC: 249.1 NG/ML (ref 10–291)
GLOBULIN SER CALC-MCNC: 3 G/DL (ref 1.9–3.5)
GLUCOSE SERPL-MCNC: 91 MG/DL (ref 65–99)
PLATELET # BLD AUTO: 245 K/UL (ref 164–446)
POTASSIUM SERPL-SCNC: 3.6 MMOL/L (ref 3.6–5.5)
PROT SERPL-MCNC: 7.8 G/DL (ref 6–8.2)
PROT SERPL-MCNC: 8 G/DL (ref 6–8.2)
SODIUM SERPL-SCNC: 136 MMOL/L (ref 135–145)
TSH SERPL DL<=0.005 MIU/L-ACNC: 2.79 UIU/ML (ref 0.38–5.33)

## 2019-10-03 PROCEDURE — 80053 COMPREHEN METABOLIC PANEL: CPT

## 2019-10-03 PROCEDURE — 82784 ASSAY IGA/IGD/IGG/IGM EACH: CPT

## 2019-10-03 PROCEDURE — 83516 IMMUNOASSAY NONANTIBODY: CPT

## 2019-10-03 PROCEDURE — 82728 ASSAY OF FERRITIN: CPT

## 2019-10-03 PROCEDURE — 80076 HEPATIC FUNCTION PANEL: CPT

## 2019-10-03 PROCEDURE — 82306 VITAMIN D 25 HYDROXY: CPT

## 2019-10-03 PROCEDURE — 86038 ANTINUCLEAR ANTIBODIES: CPT

## 2019-10-03 PROCEDURE — 36415 COLL VENOUS BLD VENIPUNCTURE: CPT

## 2019-10-03 PROCEDURE — 82947 ASSAY GLUCOSE BLOOD QUANT: CPT

## 2019-10-03 PROCEDURE — 84460 ALANINE AMINO (ALT) (SGPT): CPT

## 2019-10-03 PROCEDURE — 82103 ALPHA-1-ANTITRYPSIN TOTAL: CPT

## 2019-10-03 PROCEDURE — 84443 ASSAY THYROID STIM HORMONE: CPT

## 2019-10-03 PROCEDURE — 85049 AUTOMATED PLATELET COUNT: CPT

## 2019-10-03 PROCEDURE — 86256 FLUORESCENT ANTIBODY TITER: CPT

## 2019-10-03 PROCEDURE — 84450 TRANSFERASE (AST) (SGOT): CPT

## 2019-10-04 LAB — A1AT SERPL-MCNC: 123 MG/DL (ref 90–200)

## 2019-10-05 LAB
IGA SERPL-MCNC: 158 MG/DL (ref 68–408)
MITOCHONDRIA M2 IGG SER-ACNC: 6 UNITS (ref 0–20)
NUCLEAR IGG SER QL IA: NORMAL
TTG IGA SER IA-ACNC: 0 U/ML (ref 0–3)

## 2019-10-06 LAB
SMA IGG SER-ACNC: 25 UNITS (ref 0–19)
SMOOTH MUSCLE IGG TITR SER: ABNORMAL {TITER}

## 2019-10-07 LAB
ALT SERPL-CCNC: 52 U/L (ref 5–40)
ANNOTATION COMMENT IMP: ABNORMAL
AST SERPL-CCNC: 34 U/L (ref 9–40)
FERRITIN SERPL-MCNC: 274 NG/ML (ref 18–340)
FIBROSIS STAGE SERPL QL: ABNORMAL
GLUCOSE SERPL-MCNC: 97 MG/DL
LIVER FIBR SCORE SERPL CALC.FIBROMETER: 0.1
PATHOLOGY STUDY: ABNORMAL

## 2019-10-23 ENCOUNTER — OFFICE VISIT (OUTPATIENT)
Dept: MEDICAL GROUP | Facility: PHYSICIAN GROUP | Age: 59
End: 2019-10-23
Payer: COMMERCIAL

## 2019-10-23 VITALS
BODY MASS INDEX: 25.37 KG/M2 | HEART RATE: 74 BPM | TEMPERATURE: 97.8 F | RESPIRATION RATE: 14 BRPM | WEIGHT: 167.4 LBS | OXYGEN SATURATION: 98 % | SYSTOLIC BLOOD PRESSURE: 124 MMHG | DIASTOLIC BLOOD PRESSURE: 78 MMHG | HEIGHT: 68 IN

## 2019-10-23 DIAGNOSIS — R79.89 ELEVATED LFTS: ICD-10-CM

## 2019-10-23 DIAGNOSIS — K21.9 GASTROESOPHAGEAL REFLUX DISEASE, ESOPHAGITIS PRESENCE NOT SPECIFIED: ICD-10-CM

## 2019-10-23 DIAGNOSIS — Z51.81 MEDICATION MONITORING ENCOUNTER: ICD-10-CM

## 2019-10-23 DIAGNOSIS — K44.9 HIATAL HERNIA: ICD-10-CM

## 2019-10-23 DIAGNOSIS — E78.5 HYPERLIPIDEMIA, UNSPECIFIED HYPERLIPIDEMIA TYPE: ICD-10-CM

## 2019-10-23 DIAGNOSIS — K76.0 FATTY LIVER: ICD-10-CM

## 2019-10-23 DIAGNOSIS — Z23 NEED FOR VACCINATION: ICD-10-CM

## 2019-10-23 DIAGNOSIS — I10 ESSENTIAL HYPERTENSION: ICD-10-CM

## 2019-10-23 PROBLEM — E67.3 HIGH VITAMIN D LEVEL: Status: RESOLVED | Noted: 2019-05-22 | Resolved: 2019-10-23

## 2019-10-23 PROBLEM — Z78.9 ALCOHOL USE: Status: RESOLVED | Noted: 2019-09-03 | Resolved: 2019-10-23

## 2019-10-23 PROCEDURE — 90686 IIV4 VACC NO PRSV 0.5 ML IM: CPT | Performed by: FAMILY MEDICINE

## 2019-10-23 PROCEDURE — 90471 IMMUNIZATION ADMIN: CPT | Performed by: FAMILY MEDICINE

## 2019-10-23 PROCEDURE — 99214 OFFICE O/P EST MOD 30 MIN: CPT | Mod: 25 | Performed by: FAMILY MEDICINE

## 2019-10-23 RX ORDER — MULTIVIT WITH MINERALS/LUTEIN
400 TABLET ORAL DAILY
COMMUNITY

## 2019-10-23 RX ORDER — OMEPRAZOLE 20 MG/1
20 CAPSULE, DELAYED RELEASE ORAL DAILY
Qty: 90 CAP | Refills: 0 | Status: SHIPPED | OUTPATIENT
Start: 2019-10-23 | End: 2020-01-15

## 2019-10-23 RX ORDER — CHOLECALCIFEROL (VITAMIN D3) 50 MCG
2000 TABLET ORAL DAILY
COMMUNITY

## 2019-10-23 RX ORDER — LISINOPRIL 10 MG/1
10 TABLET ORAL DAILY
Qty: 90 TAB | Refills: 1 | Status: SHIPPED | OUTPATIENT
Start: 2019-10-23 | End: 2020-04-13

## 2019-10-23 NOTE — PROGRESS NOTES
cc: Vitamin D deficiency,    Subjective:     Trang Moses is a 59 y.o. female presenting she was last seen September 3, 2019 she did see the GI doctor and they did order lab work.  Vitamin D currently is 34, ferritin 249.1 within normal limits, her liver function test has decreased and her CMP is looking within normal limits with just mild elevation of total bilirubin at 1.6.  She did see the GI doctor and they did suggest taking vitamin D for liver health and she has started taking that.  She has also cut out all alcohol and has been exercising.  Kidney GFR within normal limits. Her son has Gilbert's disease.  Her F-actin antibody was a little high and her smooth muscle antibody was a little bit high but just borderline and so the GI let her know to keep exercising, avoiding alcohol and doing vitamin D and that we will see her back in December.  She was 177 pounds September 3 and now weighs 167 pounds and has been exercising a lot.  She did do her blood pressure and her highest blood pressure was 136/76 and her lowest was 96/77 and heart rates in the 70s to 90s and average blood pressure was 109/80.  She is not having any heartburn but she still taking omeprazole 40 mg daily.  She has been taking the lisinopril 20 mg daily for her blood pressure.  She has lost 10 pounds and she plans on trying to lose another 5.    Review of systems:     Constitutional: Negative for fever, chills and negative fatigue.   HENT: Negative for sinus pressure  Eyes: Negative for blurriness  Respiratory: Negative for cough and shortness of breath, negative for exertional shortness of breath  Cardiovascular: Negative for leg swelling, negative for palpitations, negative for chest pain  Gastrointestinal: Negative for nausea, vomiting, abdominal pain, constipation and diarrhea.  Genitourinary: Negative for dysuria and hematuria.   Neurological: Negative for dizziness, focal weakness and headaches.   Endo/Heme/Allergies: Denies bleeding,  "bruising, and recurrent allergies.  Psychiatric/Behavioral: Negative for depression and anxiety.        Current Outpatient Medications:   •  vitamin E (VITAMIN E) 1000 UNIT Cap, Take 400 Units by mouth every day., Disp: , Rfl:   •  Cholecalciferol (VITAMIN D) 2000 UNIT Tab, Take 2,000 Units by mouth every day., Disp: , Rfl:   •  lisinopril (PRINIVIL) 10 MG Tab, Take 1 Tab by mouth every day., Disp: 90 Tab, Rfl: 1  •  omeprazole (PRILOSEC) 20 MG delayed-release capsule, Take 1 Cap by mouth every day., Disp: 90 Cap, Rfl: 0  •  Omega-3 Fatty Acids (FISH OIL) 1000 MG Cap capsule, Take 1,000 mg by mouth 3 times a day, with meals., Disp: , Rfl:   •  coenzyme Q-10 30 MG capsule, Take 60 mg by mouth every day., Disp: , Rfl:   •  RED YEAST RICE EXTRACT PO, Take  by mouth., Disp: , Rfl:   •  cyanocobalamin (VITAMIN B-12) 500 MCG Tab, Take 1 Tab by mouth every day., Disp: 90 Tab, Rfl: 3  •  therapeutic multivitamin-minerals (THERAGRAN-M) Tab, Take 1 Tab by mouth every day., Disp: , Rfl:     Allergies, past medical history, past surgical history, family history, social history reviewed and updated    Objective:     Vitals: /78 (BP Location: Right arm, Patient Position: Sitting, BP Cuff Size: Adult)   Pulse 74   Temp 36.6 °C (97.8 °F) (Temporal)   Resp 14   Ht 1.727 m (5' 8\")   Wt 75.9 kg (167 lb 6.4 oz)   SpO2 98%   BMI 25.45 kg/m²   General: Alert, pleasant, NAD  HEENT: Normocephalic.  Nontraumatic. EOMI, no icterus or pallor.  Conjunctivae and lids normal. External ears normal.  Heart: Regular rate and rhythm.  S1 and S2 normal.  No murmurs appreciated.  Respiratory: Normal respiratory effort.  Clear to auscultation bilaterally.  Skin: Warm, dry, no rashes.  Musculoskeletal: Gait is normal.  Moves all extremities well.  Extremities: No leg edema.    Psych:  Affect/mood is normal, judgement is good, memory is intact, grooming is appropriate.    Assessment/Plan:     Diagnoses and all orders for this " visit:    Essential hypertension  -     lisinopril (PRINIVIL) 10 MG Tab; Take 1 Tab by mouth every day.  -     Comp Metabolic Panel; Future    Need for vaccination  -     Influenza Vaccine Quad Injection (PF)    Elevated LFTs    Gastroesophageal reflux disease, esophagitis presence not specified  -     omeprazole (PRILOSEC) 20 MG delayed-release capsule; Take 1 Cap by mouth every day.    Fatty liver    Hiatal hernia    Medication monitoring encounter  -     Comp Metabolic Panel; Future    Hyperlipidemia, unspecified hyperlipidemia type  -     Lipid Profile; Future    -We will get her flu vaccine today, at least 1 week before you see me please get fasting lab work 8 hours of no eating but plenty of water and we will recheck her CMP and lipids and she will be going to the GI doctor in December as her bilirubin was slightly off and her son does have Gilbert's as well and her anti-smooth antibody was slightly off borderline as well to but she is been working really well and lost 10 pounds and exercising and has avoided all Tylenol, aspirin, alcohol and taking vitamin E for her liver health and is doing much better now so she will keep doing this and her blood pressures have improved as well so we will cut her lisinopril from 20 mg and she will now take 10 mg and we will see how her blood pressures doing on that for an ideal range of 120s over 70s and if he gets lower than 100/60 or above 130s over 90s then we will adjust as needed and since her heartburn is doing better we will cut her omeprazole 40 mg now to 20 mg and she will do this for 30 days and then she could stop that altogether if she is not having any heartburn or take it every second day as needed and we will see how she does with this.  Otherwise she is doing much better and having good energy and she will let me know if there any issues or symptoms then we could add in more lab work of vitamin D and B12 but right now she is taking 500 mcg of vitamin B12 and  2000 units of vitamin D so no need to recheck for now.    Return in about 3 months (around 1/23/2020), or lab FU/BP/GERD.

## 2019-10-23 NOTE — PATIENT INSTRUCTIONS
Diagnoses and all orders for this visit:    Essential hypertension  -     lisinopril (PRINIVIL) 10 MG Tab; Take 1 Tab by mouth every day.  -     Comp Metabolic Panel; Future    Need for vaccination  -     Influenza Vaccine Quad Injection (PF)    Elevated LFTs    Gastroesophageal reflux disease, esophagitis presence not specified  -     omeprazole (PRILOSEC) 20 MG delayed-release capsule; Take 1 Cap by mouth every day.    Fatty liver    Hiatal hernia    Medication monitoring encounter  -     Comp Metabolic Panel; Future    Hyperlipidemia, unspecified hyperlipidemia type  -     Lipid Profile; Future    -We will get her flu vaccine today, at least 1 week before you see me please get fasting lab work 8 hours of no eating but plenty of water and we will recheck her CMP and lipids and she will be going to the GI doctor in December as her bilirubin was slightly off and her son does have Gilbert's as well and her anti-smooth antibody was slightly off borderline as well to but she is been working really well and lost 10 pounds and exercising and has avoided all Tylenol, aspirin, alcohol and taking vitamin E for her liver health and is doing much better now so she will keep doing this and her blood pressures have improved as well so we will cut her lisinopril from 20 mg and she will now take 10 mg and we will see how her blood pressures doing on that for an ideal range of 120s over 70s and if he gets lower than 100/60 or above 130s over 90s then we will adjust as needed and since her heartburn is doing better we will cut her omeprazole 40 mg now to 20 mg and she will do this for 30 days and then she could stop that altogether if she is not having any heartburn or take it every second day as needed and we will see how she does with this.  Otherwise she is doing much better and having good energy and she will let me know if there any issues or symptoms then we could add in more lab work of vitamin D and B12 but right now she  is taking 500 mcg of vitamin B12 and 2000 units of vitamin D so no need to recheck for now.    Return in about 3 months (around 1/23/2020), or lab FU/BP/GERD.    Food Choices for Gastroesophageal Reflux Disease, Adult  When you have gastroesophageal reflux disease (GERD), the foods you eat and your eating habits are very important. Choosing the right foods can help ease the discomfort of GERD.  WHAT GENERAL GUIDELINES DO I NEED TO FOLLOW?  · Choose fruits, vegetables, whole grains, low-fat dairy products, and low-fat meat, fish, and poultry.  · Limit fats such as oils, salad dressings, butter, nuts, and avocado.  · Keep a food diary to identify foods that cause symptoms.  · Avoid foods that cause reflux. These may be different for different people.  · Eat frequent small meals instead of three large meals each day.  · Eat your meals slowly, in a relaxed setting.  · Limit fried foods.  · Cook foods using methods other than frying.  · Avoid drinking alcohol.  · Avoid drinking large amounts of liquids with your meals.  · Avoid bending over or lying down until 2-3 hours after eating.  WHAT FOODS ARE NOT RECOMMENDED?  The following are some foods and drinks that may worsen your symptoms:  Vegetables  Tomatoes. Tomato juice. Tomato and spaghetti sauce. Chili peppers. Onion and garlic. Horseradish.  Fruits  Oranges, grapefruit, and lemon (fruit and juice).  Meats  High-fat meats, fish, and poultry. This includes hot dogs, ribs, ham, sausage, salami, and pickett.  Dairy  Whole milk and chocolate milk. Sour cream. Cream. Butter. Ice cream. Cream cheese.   Beverages  Coffee and tea, with or without caffeine. Carbonated beverages or energy drinks.  Condiments  Hot sauce. Barbecue sauce.   Sweets/Desserts  Chocolate and cocoa. Donuts. Peppermint and spearmint.  Fats and Oils  High-fat foods, including French fries and potato chips.  Other  Vinegar. Strong spices, such as black pepper, white pepper, red pepper, cayenne, christensen  powder, cloves, beronica, and chili powder.  The items listed above may not be a complete list of foods and beverages to avoid. Contact your dietitian for more information.     This information is not intended to replace advice given to you by your health care provider. Make sure you discuss any questions you have with your health care provider.     Document Released: 12/18/2006 Document Revised: 01/08/2016 Document Reviewed: 10/22/2014  [employer] Interactive Patient Education ©2016 [employer]

## 2020-01-10 DIAGNOSIS — I10 ESSENTIAL HYPERTENSION: ICD-10-CM

## 2020-01-10 NOTE — TELEPHONE ENCOUNTER
Was the patient seen in the last year in this department? Yes    Does patient have an active prescription for medications requested? No     Received Request Via: Pharmacy      Pt met protocol?: Yes    OV 10/19     BP Readings from Last 1 Encounters:   10/23/19 124/78

## 2020-01-13 DIAGNOSIS — K21.9 GASTROESOPHAGEAL REFLUX DISEASE, ESOPHAGITIS PRESENCE NOT SPECIFIED: ICD-10-CM

## 2020-01-13 RX ORDER — LISINOPRIL 20 MG/1
20 TABLET ORAL DAILY
Qty: 90 TAB | Refills: 1 | OUTPATIENT
Start: 2020-01-13

## 2020-01-15 RX ORDER — OMEPRAZOLE 20 MG/1
CAPSULE, DELAYED RELEASE ORAL
Qty: 90 CAP | Refills: 1 | Status: SHIPPED | OUTPATIENT
Start: 2020-01-15 | End: 2020-07-13

## 2020-01-15 NOTE — TELEPHONE ENCOUNTER
Was the patient seen in the last year in this department? Yes    Does patient have an active prescription for medications requested? No     Received Request Via: Pharmacy      Pt met protocol?: Yes    LAST OV 10/23/2019

## 2020-04-11 DIAGNOSIS — I10 ESSENTIAL HYPERTENSION: ICD-10-CM

## 2020-04-13 RX ORDER — LISINOPRIL 10 MG/1
TABLET ORAL
Qty: 90 TAB | Refills: 1 | Status: SHIPPED | OUTPATIENT
Start: 2020-04-13 | End: 2020-10-06 | Stop reason: SDUPTHER

## 2020-04-13 NOTE — TELEPHONE ENCOUNTER
Was the patient seen in the last year in this department? Yes    Does patient have an active prescription for medications requested? No     Received Request Via: Pharmacy      Pt met protocol?: Yes   Pt last ov 10/2019   BP Readings from Last 1 Encounters:   10/23/19 124/78

## 2020-04-13 NOTE — TELEPHONE ENCOUNTER
Refill X 6 months, sent to pharmacy.Pt. Seen in the last 6 months per protocol.   Lab Results   Component Value Date/Time    SODIUM 136 10/03/2019 06:22 AM    POTASSIUM 3.6 10/03/2019 06:22 AM    CHLORIDE 102 10/03/2019 06:22 AM    CO2 24 10/03/2019 06:22 AM    GLUCOSE 91 10/03/2019 06:22 AM    BUN 26 (H) 10/03/2019 06:22 AM    CREATININE 0.88 10/03/2019 06:22 AM

## 2020-06-04 ENCOUNTER — OFFICE VISIT (OUTPATIENT)
Dept: MEDICAL GROUP | Facility: PHYSICIAN GROUP | Age: 60
End: 2020-06-04
Payer: COMMERCIAL

## 2020-06-04 VITALS
OXYGEN SATURATION: 97 % | HEART RATE: 120 BPM | DIASTOLIC BLOOD PRESSURE: 80 MMHG | RESPIRATION RATE: 18 BRPM | TEMPERATURE: 98.3 F | HEIGHT: 68 IN | BODY MASS INDEX: 26.67 KG/M2 | WEIGHT: 176 LBS | SYSTOLIC BLOOD PRESSURE: 122 MMHG

## 2020-06-04 DIAGNOSIS — E53.8 VITAMIN B12 DEFICIENCY: ICD-10-CM

## 2020-06-04 DIAGNOSIS — G35 MULTIPLE SCLEROSIS (HCC): ICD-10-CM

## 2020-06-04 DIAGNOSIS — K44.9 HIATAL HERNIA: ICD-10-CM

## 2020-06-04 DIAGNOSIS — K21.9 GASTROESOPHAGEAL REFLUX DISEASE WITHOUT ESOPHAGITIS: ICD-10-CM

## 2020-06-04 DIAGNOSIS — J30.1 SEASONAL ALLERGIC RHINITIS DUE TO POLLEN: ICD-10-CM

## 2020-06-04 DIAGNOSIS — Z76.89 ESTABLISHING CARE WITH NEW DOCTOR, ENCOUNTER FOR: ICD-10-CM

## 2020-06-04 DIAGNOSIS — E78.00 PURE HYPERCHOLESTEROLEMIA: ICD-10-CM

## 2020-06-04 DIAGNOSIS — R79.89 ELEVATED LFTS: ICD-10-CM

## 2020-06-04 DIAGNOSIS — I10 ESSENTIAL HYPERTENSION: ICD-10-CM

## 2020-06-04 DIAGNOSIS — Z23 NEED FOR VACCINATION: ICD-10-CM

## 2020-06-04 DIAGNOSIS — B15.9 VIRAL HEPATITIS A WITHOUT HEPATIC COMA: ICD-10-CM

## 2020-06-04 DIAGNOSIS — Z12.31 ENCOUNTER FOR SCREENING MAMMOGRAM FOR BREAST CANCER: ICD-10-CM

## 2020-06-04 DIAGNOSIS — E55.9 VITAMIN D DEFICIENCY: ICD-10-CM

## 2020-06-04 PROBLEM — Z51.81 ENCOUNTER FOR THERAPEUTIC DRUG MONITORING: Status: RESOLVED | Noted: 2019-09-03 | Resolved: 2020-06-04

## 2020-06-04 PROCEDURE — 99214 OFFICE O/P EST MOD 30 MIN: CPT | Performed by: NURSE PRACTITIONER

## 2020-06-04 RX ORDER — FEXOFENADINE HCL 60 MG/1
60 TABLET, FILM COATED ORAL DAILY
COMMUNITY
End: 2020-10-06

## 2020-06-04 SDOH — HEALTH STABILITY: MENTAL HEALTH: HOW MANY STANDARD DRINKS CONTAINING ALCOHOL DO YOU HAVE ON A TYPICAL DAY?: NOT ASKED

## 2020-06-04 SDOH — HEALTH STABILITY: MENTAL HEALTH: HOW OFTEN DO YOU HAVE A DRINK CONTAINING ALCOHOL?: NOT ASKED

## 2020-06-04 ASSESSMENT — FIBROSIS 4 INDEX: FIB4 SCORE: 1.12

## 2020-06-04 ASSESSMENT — PATIENT HEALTH QUESTIONNAIRE - PHQ9: CLINICAL INTERPRETATION OF PHQ2 SCORE: 0

## 2020-06-04 NOTE — ASSESSMENT & PLAN NOTE
New problem to examiner.  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  Patient reports that she has no current symptoms and is not following with neurology.

## 2020-06-04 NOTE — ASSESSMENT & PLAN NOTE
New problem to examiner.  Chronic problem for the patient that is ongoing.  She does not take prescription medication for this issue.  She does use over-the-counter red yeast rice and omega-3 fatty acids.  Reports that in the past she has improved her diet, lost weight and has had improved lipid numbers on recheck.  Would like to continue working on lifestyle changes.   10/19/2016 09:16 4/14/2017 09:32 5/17/2018 10:15 5/15/2019 09:58   Cholesterol,Tot 288 (H) 290 (H) 289 (H) 275 (H)   Triglycerides 160 (H) 121 152 (H) 140   HDL 82 92 104 70    (H) 174 (H) 155 (H) 177 (H)

## 2020-06-04 NOTE — ASSESSMENT & PLAN NOTE
New problem to examiner.  Chronic problem for the patient that is ongoing and followed by gastroenterology.  Patient states that she has not received treatment for this issue.

## 2020-06-04 NOTE — ASSESSMENT & PLAN NOTE
New problem to examiner.  Chronic problem for the patient that is ongoing.  Continues to use over-the-counter Allegra and reports that symptoms are well managed.  States that she does have a slight cough in the morning that generally resolves quickly.

## 2020-06-04 NOTE — ASSESSMENT & PLAN NOTE
New problem to examiner.  Chronic problem for the patient that is ongoing.  Continues over-the-counter vitamin B12 supplement daily.  Due for updated labs.

## 2020-06-04 NOTE — ASSESSMENT & PLAN NOTE
"New problem to examiner.  Chronic problem for the patient that is ongoing.  Patient reports symptoms are well controlled with omeprazole 20 mg/day.  She does follow with gastroenterology.  Reports that her EGD has been completed in the past and is \"okay\".  Denies nausea, vomiting, abdominal pain, trouble swallowing, bloody stools.  "

## 2020-06-04 NOTE — ASSESSMENT & PLAN NOTE
"This is a new problem to the examiner.  Chronic, stable.    Currently taking lisinopril 10 mg/day as directed.   Reports diet is \" good\".   She is not following a low-salt diet.  She is exercising regularly.  She is not taking baby aspirin daily.   She is not monitoring BP at home.   Denies symptoms low BP: light-headed, tunnel-vision, unusual fatigue, dizziness.  Denies symptoms high BP: pounding headache, visual changes, palpitations, flushed face.   Denies chest pain, shortness of breath, dyspnea on exertion.   Denies medicine side effects: unusual fatigue, slow heartbeat, foot/leg swelling, cough.  Vitals 5/22/2019 9/3/2019 10/23/2019 6/4/2020   SYSTOLIC 122 138 124 122   DIASTOLIC 86 88 78 80   PULSE 76 64 74 120     "

## 2020-06-07 PROBLEM — R92.2 DENSE BREAST: Status: RESOLVED | Noted: 2019-05-22 | Resolved: 2020-06-07

## 2020-06-07 PROBLEM — R92.30 DENSE BREAST: Status: RESOLVED | Noted: 2019-05-22 | Resolved: 2020-06-07

## 2020-06-07 NOTE — ASSESSMENT & PLAN NOTE
New problem to examiner.  Chronic problem for the patient that is ongoing.  Continues to use over-the-counter vitamin D supplement daily.  Due for updated labs.

## 2020-06-07 NOTE — ASSESSMENT & PLAN NOTE
New problem to examiner.  Chronic problem for the patient that is ongoing and followed by gastroenterology.

## 2020-06-07 NOTE — PROGRESS NOTES
"CC:   Chief Complaint   Patient presents with   • Establish Care     HISTORY OF THE PRESENT ILLNESS: Patient is a 59 y.o. female. This pleasant patient is here today to establish care and discuss multiple issues as listed below.    Health Maintenance: Completed    Hyperlipidemia  New problem to examiner.  Chronic problem for the patient that is ongoing.  She does not take prescription medication for this issue.  She does use over-the-counter red yeast rice and omega-3 fatty acids.  Reports that in the past she has improved her diet, lost weight and has had improved lipid numbers on recheck.  Would like to continue working on lifestyle changes.   10/19/2016 09:16 4/14/2017 09:32 5/17/2018 10:15 5/15/2019 09:58   Cholesterol,Tot 288 (H) 290 (H) 289 (H) 275 (H)   Triglycerides 160 (H) 121 152 (H) 140   HDL 82 92 104 70    (H) 174 (H) 155 (H) 177 (H)       Essential hypertension  This is a new problem to the examiner.  Chronic, stable.    Currently taking lisinopril 10 mg/day as directed.   Reports diet is \" good\".   She is not following a low-salt diet.  She is exercising regularly.  She is not taking baby aspirin daily.   She is not monitoring BP at home.   Denies symptoms low BP: light-headed, tunnel-vision, unusual fatigue, dizziness.  Denies symptoms high BP: pounding headache, visual changes, palpitations, flushed face.   Denies chest pain, shortness of breath, dyspnea on exertion.   Denies medicine side effects: unusual fatigue, slow heartbeat, foot/leg swelling, cough.  Vitals 5/22/2019 9/3/2019 10/23/2019 6/4/2020   SYSTOLIC 122 138 124 122   DIASTOLIC 86 88 78 80   PULSE 76 64 74 120       MS (multiple sclerosis) (HCC)  New problem to examiner.  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  Patient reports that she has no current symptoms and is not following with neurology.    Gastroesophageal reflux disease without esophagitis  New problem to examiner.  Chronic problem " "for the patient that is ongoing.  Patient reports symptoms are well controlled with omeprazole 20 mg/day.  She does follow with gastroenterology, states that she has a hiatal hernia as well.  Reports that her EGD has been completed in the past and is \"okay\".  Denies nausea, vomiting, abdominal pain, trouble swallowing, bloody stools.    Hiatal hernia  New problem to examiner.  Chronic problem for the patient that is ongoing.  Patient reports symptoms are well controlled with omeprazole 20 mg/day.  She does follow with gastroenterology.  Reports that her EGD has been completed in the past and is \"okay\".  Denies nausea, vomiting, abdominal pain, trouble swallowing, bloody stools.    Vitamin B12 deficiency  New problem to examiner.  Chronic problem for the patient that is ongoing.  Continues over-the-counter vitamin B12 supplement daily.  Due for updated labs.    Viral hepatitis A without hepatic coma  New problem to examiner.  Chronic problem for the patient that is ongoing and followed by gastroenterology.  Patient states that she has not received treatment for this issue.    Seasonal allergic rhinitis due to pollen  New problem to examiner.  Chronic problem for the patient that is ongoing.  Continues to use over-the-counter Allegra and reports that symptoms are well managed.  States that she does have a slight cough in the morning that generally resolves quickly.    Elevated LFTs  New problem to examiner.  Chronic problem for the patient that is ongoing and followed by gastroenterology.    Vitamin D deficiency  New problem to examiner.  Chronic problem for the patient that is ongoing.  Continues to use over-the-counter vitamin D supplement daily.  Due for updated labs.    Allergies: Doxycycline; Morphine; and Penicillins  Current Outpatient Medications Ordered in Epic   Medication Sig Dispense Refill   • fexofenadine (ALLEGRA) 60 MG Tab Take 60 mg by mouth every day.     • lisinopril (PRINIVIL) 10 MG Tab TAKE 1 " TABLET BY MOUTH EVERY DAY 90 Tab 1   • omeprazole (PRILOSEC) 20 MG delayed-release capsule TAKE 1 CAPSULE BY MOUTH EVERY DAY 90 Cap 1   • vitamin E (VITAMIN E) 1000 UNIT Cap Take 400 Units by mouth every day.     • Cholecalciferol (VITAMIN D) 2000 UNIT Tab Take 2,000 Units by mouth every day.     • Omega-3 Fatty Acids (FISH OIL) 1000 MG Cap capsule Take 1,000 mg by mouth 3 times a day, with meals.     • coenzyme Q-10 30 MG capsule Take 60 mg by mouth every day.     • RED YEAST RICE EXTRACT PO Take  by mouth.     • cyanocobalamin (VITAMIN B-12) 500 MCG Tab Take 1 Tab by mouth every day. 90 Tab 3   • therapeutic multivitamin-minerals (THERAGRAN-M) Tab Take 1 Tab by mouth every day.       No current [facility]-ordered facility-administered medications on file.      Past Medical History:   Diagnosis Date   • Allergy    • Dense breast 5/22/2019   • GERD (gastroesophageal reflux disease)    • Gilbert disease    • Hyperlipidemia    • Hypertension    • MS (multiple sclerosis) (HCC)    • Muscle disorder     Multiple sclerosis     Past Surgical History:   Procedure Laterality Date   • BREAST RECONSTRUCTION      BL reductions   • CHOLECYSTECTOMY       Social History     Tobacco Use   • Smoking status: Never Smoker   • Smokeless tobacco: Never Used   Substance Use Topics   • Alcohol use: Yes     Alcohol/week: 1.8 - 2.4 oz     Types: 3 - 4 Shots of liquor per week   • Drug use: No     Social History     Social History Narrative   • Not on file     Family History   Problem Relation Age of Onset   • Hypertension Mother    • Hyperlipidemia Mother    • Lung Disease Father         emphysema (smoking)   • Heart Disease Father    • Heart Failure Father    • Cancer Sister         breast CA   • Breast Cancer Sister    • No Known Problems Brother    • Hypertension Sister    • Hyperlipidemia Sister    • Other Sister         MS multiple sclerosis   • Mult Sclerosis Sister    • Thyroid Sister    • Psychiatric Illness Sister    • No Known Problems  "Sister    • No Known Problems Brother    • Drug abuse Sister    • No Known Problems Maternal Aunt    • No Known Problems Maternal Uncle    • No Known Problems Paternal Aunt    • Hypertension Maternal Grandmother    • No Known Problems Maternal Grandfather    • No Known Problems Paternal Grandmother    • No Known Problems Paternal Grandfather    • No Known Problems Paternal Aunt    • No Known Problems Paternal Aunt    • GI Disease Son         Ester   • Diabetes Son      ROS:   Constitutional: No fevers, chills, malaise/fatigue.  Eyes: No eye pain.  ENT: No sore throat, congestion.   Resp: No cough, shortness of breath.  CV: No chest pain, leg swelling, palpitations.  GI: No nausea/vomiting, abdominal pain, constipation, diarrhea.  : No dysuria, hematuria.  MSK: No weakness.  Skin: No rashes.  Neuro: + Numbness and tingling in bilateral hands related to MS.  No dizziness, weakness, headaches.  Psych: No suicidal ideations.    All remaining systems reviewed and found to be negative, except as stated above.       Exam: /80   Pulse (!) 120   Temp 36.8 °C (98.3 °F) (Temporal)   Resp 18   Ht 1.727 m (5' 8\")   Wt 79.8 kg (176 lb)   SpO2 97%  Body mass index is 26.76 kg/m².    General:  Normal appearing. No distress. Eye contact: good .  HEENT:  Normocephalic. Eyes conjunctiva clear lids without ptosis, pupils equal and reactive to light accommodation, ears normal shape and contour, canals are clear bilaterally, tympanic membranes are benign, nasal mucosa benign, oropharynx is without erythema, edema or exudates. Sinuses (frontal and maxillary) non tender to palpation.  Neck:  Supple without JVD or bruit. Thyroid is not enlarged.  Pulmonary:  Clear to ausculation.  Normal effort. No rales, rhonchi, or wheezing.  Cardiovascular:  Regular rate and rhythm without murmur. Carotid and radial pulses are intact and equal bilaterally.  Abdomen:  Soft, non tender, non distended. Normal bowel sounds.   Neurologic:  " Grossly non focal.  Lymph:  No cervical, supraclavicular lymph nodes are palpable.  Skin:  Warm and dry.  No obvious lesions.  Musculoskeletal:  Normal gait. No extremity cyanosis, clubbing, or edema.  Psych:  Mood and Affect normal. Alert and oriented x3. Judgment and insight is normal.    Assessment/Plan:  1. Establishing care with new doctor, encounter for    2. MS (multiple sclerosis) (HCC)  Chronic, stable without treatment.  Continue to monitor.    3. Pure hypercholesterolemia  Chronic, ongoing.  Not on prescription medication for this issue.  Patient prefers to work on healthy lifestyle including healthy diet and exercise with weight loss.  Continues over-the-counter supplements red yeast rice and omega-3 fish oil.  Due for updated labs.  - Comp Metabolic Panel; Future  - Lipid Profile; Future    4. Essential hypertension  Chronic, stable.  Continue lisinopril 10 mg/day, does not need refill at this time.  Due for annual labs.  - Comp Metabolic Panel; Future  - MICROALBUMIN CREAT RATIO URINE; Future  - TSH WITH REFLEX TO FT4; Future    5. Vitamin D deficiency  Chronic, ongoing.  Continue over-the-counter vitamin D supplement daily.  Due for annual labs.  - VITAMIN D,25 HYDROXY; Future    6. Vitamin B12 deficiency  Chronic, ongoing.  Continue over-the-counter vitamin B12 supplement daily.  Due for annual labs.  - CBC WITH DIFFERENTIAL; Future  - VITAMIN B12; Future    7. Seasonal allergic rhinitis due to pollen  Chronic, stable.  Continue over-the-counter Allegra daily.    8. Gastroesophageal reflux disease without esophagitis  9. Hiatal hernia  10. Viral hepatitis A without hepatic coma  11. Elevated LFTs  Chronic, ongoing and followed by gastroenterology, [facility].  Continue omeprazole 20 mg/day, does not need refill at this time.  Due for updated labs.  - Comp Metabolic Panel; Future    12. Encounter for screening mammogram for breast cancer  - MA-SCREENING MAMMO BILAT W/CAD;  Future    13. Need for vaccination  - Zoster Vac Recomb Adjuvanted (SHINGRIX) 50 MCG/0.5ML Recon Susp; 0.5 mL by Intramuscular route Once for 1 dose.  Dispense: 0.5 mL; Refill: 0  Patient provided with paper prescription for Shingrix vaccine.  Advised patient to take prescription to pharmacy to check for availability and/or to be put on a waiting list.  Advised the patient that Shingrix is a series of two consecutive vaccines.    Educated in proper administration of medication(s) ordered today including safety, possible SE, risks, benefits, rationale and alternatives to therapy.   Supportive care, differential diagnoses, and indications for immediate follow-up discussed with patient.    Pathogenesis of diagnosis discussed including typical length and natural progression.    Instructed to return to clinic or nearest emergency department for any change in condition, further concerns, or worsening of symptoms.  Patient states understanding of the plan of care and discharge instructions.    Return in about 3 weeks (around 6/25/2020) for High Cholesterol, Follow up Labs, As needed.    Please note that this dictation was created using voice recognition software. I have made every reasonable attempt to correct obvious errors, but I expect that there are errors of grammar and possibly content that I did not discover before finalizing the note.

## 2020-06-22 ENCOUNTER — HOSPITAL ENCOUNTER (OUTPATIENT)
Dept: RADIOLOGY | Facility: MEDICAL CENTER | Age: 60
End: 2020-06-22
Attending: NURSE PRACTITIONER
Payer: COMMERCIAL

## 2020-06-22 DIAGNOSIS — Z12.31 VISIT FOR SCREENING MAMMOGRAM: ICD-10-CM

## 2020-06-22 PROCEDURE — 77067 SCR MAMMO BI INCL CAD: CPT

## 2020-06-25 ENCOUNTER — HOSPITAL ENCOUNTER (OUTPATIENT)
Dept: LAB | Facility: MEDICAL CENTER | Age: 60
End: 2020-06-25
Attending: NURSE PRACTITIONER
Payer: COMMERCIAL

## 2020-06-25 DIAGNOSIS — I10 ESSENTIAL HYPERTENSION: ICD-10-CM

## 2020-06-25 DIAGNOSIS — R79.89 ELEVATED LFTS: ICD-10-CM

## 2020-06-25 DIAGNOSIS — E78.00 PURE HYPERCHOLESTEROLEMIA: ICD-10-CM

## 2020-06-25 DIAGNOSIS — E53.8 VITAMIN B12 DEFICIENCY: ICD-10-CM

## 2020-06-25 DIAGNOSIS — E55.9 VITAMIN D DEFICIENCY: ICD-10-CM

## 2020-06-25 LAB
25(OH)D3 SERPL-MCNC: 46 NG/ML (ref 30–100)
ALBUMIN SERPL BCP-MCNC: 4.5 G/DL (ref 3.2–4.9)
ALBUMIN/GLOB SERPL: 1.4 G/DL
ALP SERPL-CCNC: 83 U/L (ref 30–99)
ALT SERPL-CCNC: 210 U/L (ref 2–50)
ANION GAP SERPL CALC-SCNC: 13 MMOL/L (ref 7–16)
AST SERPL-CCNC: 151 U/L (ref 12–45)
BASOPHILS # BLD AUTO: 1.3 % (ref 0–1.8)
BASOPHILS # BLD: 0.06 K/UL (ref 0–0.12)
BILIRUB SERPL-MCNC: 1.3 MG/DL (ref 0.1–1.5)
BUN SERPL-MCNC: 16 MG/DL (ref 8–22)
CALCIUM SERPL-MCNC: 9.4 MG/DL (ref 8.5–10.5)
CHLORIDE SERPL-SCNC: 101 MMOL/L (ref 96–112)
CHOLEST SERPL-MCNC: 302 MG/DL (ref 100–199)
CO2 SERPL-SCNC: 23 MMOL/L (ref 20–33)
CREAT SERPL-MCNC: 0.6 MG/DL (ref 0.5–1.4)
CREAT UR-MCNC: 119.27 MG/DL
EOSINOPHIL # BLD AUTO: 0.06 K/UL (ref 0–0.51)
EOSINOPHIL NFR BLD: 1.3 % (ref 0–6.9)
ERYTHROCYTE [DISTWIDTH] IN BLOOD BY AUTOMATED COUNT: 44.6 FL (ref 35.9–50)
FASTING STATUS PATIENT QL REPORTED: NORMAL
GLOBULIN SER CALC-MCNC: 3.2 G/DL (ref 1.9–3.5)
GLUCOSE SERPL-MCNC: 80 MG/DL (ref 65–99)
HCT VFR BLD AUTO: 46.4 % (ref 37–47)
HDLC SERPL-MCNC: 79 MG/DL
HGB BLD-MCNC: 15.7 G/DL (ref 12–16)
IMM GRANULOCYTES # BLD AUTO: 0.03 K/UL (ref 0–0.11)
IMM GRANULOCYTES NFR BLD AUTO: 0.6 % (ref 0–0.9)
LDLC SERPL CALC-MCNC: 198 MG/DL
LYMPHOCYTES # BLD AUTO: 1.63 K/UL (ref 1–4.8)
LYMPHOCYTES NFR BLD: 34.5 % (ref 22–41)
MCH RBC QN AUTO: 32.4 PG (ref 27–33)
MCHC RBC AUTO-ENTMCNC: 33.8 G/DL (ref 33.6–35)
MCV RBC AUTO: 95.7 FL (ref 81.4–97.8)
MICROALBUMIN UR-MCNC: 2.1 MG/DL
MICROALBUMIN/CREAT UR: 18 MG/G (ref 0–30)
MONOCYTES # BLD AUTO: 0.43 K/UL (ref 0–0.85)
MONOCYTES NFR BLD AUTO: 9.1 % (ref 0–13.4)
NEUTROPHILS # BLD AUTO: 2.51 K/UL (ref 2–7.15)
NEUTROPHILS NFR BLD: 53.2 % (ref 44–72)
NRBC # BLD AUTO: 0 K/UL
NRBC BLD-RTO: 0 /100 WBC
PLATELET # BLD AUTO: 179 K/UL (ref 164–446)
PMV BLD AUTO: 11.6 FL (ref 9–12.9)
POTASSIUM SERPL-SCNC: 4.1 MMOL/L (ref 3.6–5.5)
PROT SERPL-MCNC: 7.7 G/DL (ref 6–8.2)
RBC # BLD AUTO: 4.85 M/UL (ref 4.2–5.4)
SODIUM SERPL-SCNC: 137 MMOL/L (ref 135–145)
TRIGL SERPL-MCNC: 127 MG/DL (ref 0–149)
TSH SERPL DL<=0.005 MIU/L-ACNC: 1.96 UIU/ML (ref 0.38–5.33)
VIT B12 SERPL-MCNC: 840 PG/ML (ref 211–911)
WBC # BLD AUTO: 4.7 K/UL (ref 4.8–10.8)

## 2020-06-25 PROCEDURE — 85025 COMPLETE CBC W/AUTO DIFF WBC: CPT

## 2020-06-25 PROCEDURE — 80061 LIPID PANEL: CPT

## 2020-06-25 PROCEDURE — 82607 VITAMIN B-12: CPT

## 2020-06-25 PROCEDURE — 82043 UR ALBUMIN QUANTITATIVE: CPT

## 2020-06-25 PROCEDURE — 82306 VITAMIN D 25 HYDROXY: CPT

## 2020-06-25 PROCEDURE — 84443 ASSAY THYROID STIM HORMONE: CPT

## 2020-06-25 PROCEDURE — 80053 COMPREHEN METABOLIC PANEL: CPT

## 2020-06-25 PROCEDURE — 82570 ASSAY OF URINE CREATININE: CPT

## 2020-06-25 PROCEDURE — 36415 COLL VENOUS BLD VENIPUNCTURE: CPT

## 2020-06-30 ENCOUNTER — TELEMEDICINE (OUTPATIENT)
Dept: MEDICAL GROUP | Facility: PHYSICIAN GROUP | Age: 60
End: 2020-06-30
Payer: COMMERCIAL

## 2020-06-30 VITALS — BODY MASS INDEX: 26.67 KG/M2 | WEIGHT: 176 LBS | HEIGHT: 68 IN | TEMPERATURE: 97.5 F

## 2020-06-30 DIAGNOSIS — E55.9 VITAMIN D DEFICIENCY: ICD-10-CM

## 2020-06-30 DIAGNOSIS — R79.89 ELEVATED LFTS: ICD-10-CM

## 2020-06-30 DIAGNOSIS — E78.2 MIXED HYPERLIPIDEMIA: ICD-10-CM

## 2020-06-30 PROCEDURE — 99214 OFFICE O/P EST MOD 30 MIN: CPT | Mod: 95,CR | Performed by: NURSE PRACTITIONER

## 2020-06-30 RX ORDER — ATORVASTATIN CALCIUM 20 MG/1
20 TABLET, FILM COATED ORAL
Qty: 90 TAB | Refills: 0 | Status: SHIPPED | OUTPATIENT
Start: 2020-06-30 | End: 2020-07-20 | Stop reason: SINTOL

## 2020-06-30 ASSESSMENT — FIBROSIS 4 INDEX: FIB4 SCORE: 3.38

## 2020-06-30 NOTE — PROGRESS NOTES
Telemedicine Visit: Established Patient     This encounter was conducted via Zoom .   Verbal consent was obtained. Patient's identity was verified.    Subjective:     Chief Complaint   Patient presents with   • Results     labs     Trang Moses is a 59 y.o. female presenting for evaluation and management of:    Elevated LFTs  Chronic, ongoing.  Continues to follow with gastroenterology.   10/3/2019 06:22 10/3/2019 06:22 10/3/2019 06:22 6/25/2020 10:09   AST(SGOT) 30 30 34 151 (H)   ALT(SGPT) 42 44 52 (H) 210 (H)   Alk Phos 68 68  83       Mixed hyperlipidemia  Chronic, uncontrolled.  Does not take prescription medication for this issue.  Continues over-the-counter red yeast rice and omega-3 fatty acids.  Patient reports that she has had issues with elevated cholesterol levels since she was 19 years old, regardless of her weight/BMI.  Reports that she is unable to make any difference with her cholesterol with lifestyle interventions.  Is interested in starting prescription medication.   4/14/2017 09:32 5/17/2018 10:15 5/15/2019 09:58 6/25/2020 10:09   Cholesterol,Tot 290 (H) 289 (H) 275 (H) 302 (H)   Triglycerides 121 152 (H) 140 127   HDL 92 104 70 79    (H) 155 (H) 177 (H) 198 (H)       Vitamin D deficiency  Chronic, stable.  No history of CKD.  Reports having a good diet, including dairy products.  No history of IBD's, celiac, CF, or surgeries causing malabsorption.  Patient is not obese.  Is taking vitamin d supplement.   Due for updated lab work in June 2021.    5/6/2016 09:50 4/14/2017 09:32 5/17/2018 10:15 5/15/2019 09:58 10/3/2019 06:22 6/25/2020 10:09   25-Hydroxy   Vitamin D 25 8 (L) 6 (L) 65 106 (H) 34 46     ROS   Constitutional: No fevers, chills, malaise/fatigue.  Eyes: No eye pain.  ENT: No sore throat, congestion.   Resp: No cough, shortness of breath.  CV: No chest pain, leg swelling, palpitations.  GI: No nausea/vomiting, abdominal pain, constipation, diarrhea.  : No dysuria,  hematuria.  MSK: No weakness.  Skin: No rashes.  Neuro: + Numbness and tingling in bilateral hands related to MS. No dizziness, weakness, headaches.  Psych: No suicidal ideations.    All remaining systems reviewed and found to be negative, except as stated above.     Allergies   Allergen Reactions   • Doxycycline Rash     recent   • Morphine Anaphylaxis   • Penicillins Rash     .     Current medicines (including changes today)  Current Outpatient Medications   Medication Sig Dispense Refill   • atorvastatin (LIPITOR) 20 MG Tab Take 1 Tab by mouth every bedtime. 90 Tab 0   • fexofenadine (ALLEGRA) 60 MG Tab Take 60 mg by mouth every day.     • lisinopril (PRINIVIL) 10 MG Tab TAKE 1 TABLET BY MOUTH EVERY DAY 90 Tab 1   • omeprazole (PRILOSEC) 20 MG delayed-release capsule TAKE 1 CAPSULE BY MOUTH EVERY DAY 90 Cap 1   • vitamin E (VITAMIN E) 1000 UNIT Cap Take 400 Units by mouth every day.     • Cholecalciferol (VITAMIN D) 2000 UNIT Tab Take 2,000 Units by mouth every day.     • Omega-3 Fatty Acids (FISH OIL) 1000 MG Cap capsule Take 1,000 mg by mouth 3 times a day, with meals.     • coenzyme Q-10 30 MG capsule Take 60 mg by mouth every day.     • cyanocobalamin (VITAMIN B-12) 500 MCG Tab Take 1 Tab by mouth every day. 90 Tab 3   • therapeutic multivitamin-minerals (THERAGRAN-M) Tab Take 1 Tab by mouth every day.       No current facility-administered medications for this visit.      Patient Active Problem List    Diagnosis Date Noted   • Vitamin D deficiency 06/04/2020   • Hiatal hernia 09/03/2019   • Vitamin B12 deficiency 09/03/2019   • Other insomnia 09/03/2019   • Fatty liver 09/03/2019   • Seasonal allergic rhinitis due to pollen 05/22/2019   • Chronic midline low back pain with right-sided sciatica 05/22/2019   • Aspirin long-term use 05/22/2019   • Gastroesophageal reflux disease without esophagitis 05/22/2019   • Viral hepatitis A without hepatic coma 05/22/2019   • Elevated LFTs 05/25/2018   • Mixed  "hyperlipidemia 05/09/2016   • MS (multiple sclerosis) (HCC) 03/31/2016   • Essential hypertension 03/31/2016     Family History   Problem Relation Age of Onset   • Hypertension Mother    • Hyperlipidemia Mother    • Lung Disease Father         emphysema (smoking)   • Heart Disease Father    • Heart Failure Father    • Cancer Sister         breast CA   • Breast Cancer Sister    • No Known Problems Brother    • Hypertension Sister    • Hyperlipidemia Sister    • Other Sister         MS multiple sclerosis   • Mult Sclerosis Sister    • Thyroid Sister    • Psychiatric Illness Sister    • No Known Problems Sister    • No Known Problems Brother    • Drug abuse Sister    • No Known Problems Maternal Aunt    • No Known Problems Maternal Uncle    • No Known Problems Paternal Aunt    • Hypertension Maternal Grandmother    • No Known Problems Maternal Grandfather    • No Known Problems Paternal Grandmother    • No Known Problems Paternal Grandfather    • No Known Problems Paternal Aunt    • No Known Problems Paternal Aunt    • GI Disease Son         Ester   • Diabetes Son      She  has a past medical history of Allergy, Dense breast (5/22/2019), GERD (gastroesophageal reflux disease), Gilbert disease, Hyperlipidemia, Hypertension, MS (multiple sclerosis) ([facility]), and Muscle disorder.  She  has a past surgical history that includes cholecystectomy and breast reconstruction.     Objective:   Temp 36.4 °C (97.5 °F) (Temporal)   Ht 1.727 m (5' 8\")   Wt 79.8 kg (176 lb)   BMI 26.76 kg/m²     Physical Exam:  Constitutional: Alert, no distress, well-groomed.  Skin: No rashes in visible areas.  Eye: Round. Conjunctiva clear, lids normal. No icterus.   ENMT: Lips pink without lesions, good dentition, moist mucous membranes. Phonation normal.  Neck: No masses, no thyromegaly. Moves freely without pain.  Respiratory: Unlabored respiratory effort, no cough or audible wheeze  Psych: Alert and oriented x3, normal affect and mood. "     Assessment and Plan:   The following treatment plan was discussed:     1. Elevated LFTs  Chronic, ongoing.  Continue to follow with gastroenterology.  Plan to repeat CMP in 3 months.  - Comp Metabolic Panel; Future    2. Mixed hyperlipidemia  Chronic, uncontrolled.  Encouraged healthy diet, exercise, weight loss.  Patient is interested in starting atorvastatin 20 mg/day.  Discussed possible side effects of medication including myalgias.  Plan to repeat CMP and lipid in 3 months for close monitoring of LFTs with history of elevated LFTs and fatty liver.  Patient agrees to contact PCP if any side effects or concerning symptoms develop.  - atorvastatin (LIPITOR) 20 MG Tab; Take 1 Tab by mouth every bedtime.  Dispense: 90 Tab; Refill: 0  - Comp Metabolic Panel; Future  - Lipid Profile; Future    3. Vitamin D deficiency  Chronic, stable.  Continue over-the-counter vitamin D supplement daily.  Due to repeat vitamin D levels in June 2021.    Follow-up: Return in about 3 months (around 9/30/2020) for Video Visit, High Cholesterol, Follow up Labs, Follow up Medications.         Please note that this dictation was created using voice recognition software. I have worked with consultants from the vendor as well as technical experts from [employer] to optimize the interface. I have made every reasonable attempt to correct obvious errors, but I expect that there are errors of grammar and possibly content that I did not discover before finalizing the note.

## 2020-06-30 NOTE — ASSESSMENT & PLAN NOTE
Chronic, stable.  No history of CKD.  Reports having a good diet, including dairy products.  No history of IBD's, celiac, CF, or surgeries causing malabsorption.  Patient is not obese.  Is taking vitamin d supplement.   Due for updated lab work in June 2021.    5/6/2016 09:50 4/14/2017 09:32 5/17/2018 10:15 5/15/2019 09:58 10/3/2019 06:22 6/25/2020 10:09   25-Hydroxy   Vitamin D 25 8 (L) 6 (L) 65 106 (H) 34 46

## 2020-06-30 NOTE — ASSESSMENT & PLAN NOTE
Chronic, ongoing.  Continues to follow with gastroenterology.   10/3/2019 06:22 10/3/2019 06:22 10/3/2019 06:22 6/25/2020 10:09   AST(SGOT) 30 30 34 151 (H)   ALT(SGPT) 42 44 52 (H) 210 (H)   Alk Phos 68 68  83

## 2020-06-30 NOTE — ASSESSMENT & PLAN NOTE
Chronic, uncontrolled.  Does not take prescription medication for this issue.  Continues over-the-counter red yeast rice and omega-3 fatty acids.  Patient reports that she has had issues with elevated cholesterol levels since she was 19 years old, regardless of her weight/BMI.  Reports that she is unable to make any difference with her cholesterol with lifestyle interventions.  Is interested in starting prescription medication.   4/14/2017 09:32 5/17/2018 10:15 5/15/2019 09:58 6/25/2020 10:09   Cholesterol,Tot 290 (H) 289 (H) 275 (H) 302 (H)   Triglycerides 121 152 (H) 140 127   HDL 92 104 70 79    (H) 155 (H) 177 (H) 198 (H)

## 2020-07-10 DIAGNOSIS — K21.9 GASTROESOPHAGEAL REFLUX DISEASE, ESOPHAGITIS PRESENCE NOT SPECIFIED: ICD-10-CM

## 2020-07-13 DIAGNOSIS — K21.9 GASTROESOPHAGEAL REFLUX DISEASE, ESOPHAGITIS PRESENCE NOT SPECIFIED: ICD-10-CM

## 2020-07-13 RX ORDER — OMEPRAZOLE 20 MG/1
CAPSULE, DELAYED RELEASE ORAL
Qty: 90 CAP | Refills: 1 | Status: SHIPPED | OUTPATIENT
Start: 2020-07-13 | End: 2020-07-13 | Stop reason: SDUPTHER

## 2020-07-13 RX ORDER — OMEPRAZOLE 20 MG/1
20 CAPSULE, DELAYED RELEASE ORAL
Qty: 90 CAP | Refills: 3 | Status: SHIPPED | OUTPATIENT
Start: 2020-07-13

## 2020-07-13 NOTE — PROGRESS NOTES
Requested Prescriptions     Signed Prescriptions Disp Refills   • omeprazole (PRILOSEC) 20 MG delayed-release capsule 90 Cap 3     Sig: Take 1 Cap by mouth every morning before breakfast.       Ivette Gamez A.P.R.N.

## 2020-10-01 ENCOUNTER — HOSPITAL ENCOUNTER (OUTPATIENT)
Dept: LAB | Facility: MEDICAL CENTER | Age: 60
End: 2020-10-01
Attending: NURSE PRACTITIONER
Payer: COMMERCIAL

## 2020-10-01 DIAGNOSIS — E78.2 MIXED HYPERLIPIDEMIA: ICD-10-CM

## 2020-10-01 DIAGNOSIS — R79.89 ELEVATED LFTS: ICD-10-CM

## 2020-10-01 LAB
ALBUMIN SERPL BCP-MCNC: 4.3 G/DL (ref 3.2–4.9)
ALBUMIN/GLOB SERPL: 1.4 G/DL
ALP SERPL-CCNC: 92 U/L (ref 30–99)
ALT SERPL-CCNC: 291 U/L (ref 2–50)
ANION GAP SERPL CALC-SCNC: 12 MMOL/L (ref 7–16)
AST SERPL-CCNC: 294 U/L (ref 12–45)
BILIRUB SERPL-MCNC: 1.9 MG/DL (ref 0.1–1.5)
BUN SERPL-MCNC: 12 MG/DL (ref 8–22)
CALCIUM SERPL-MCNC: 9.6 MG/DL (ref 8.5–10.5)
CHLORIDE SERPL-SCNC: 101 MMOL/L (ref 96–112)
CHOLEST SERPL-MCNC: 289 MG/DL (ref 100–199)
CO2 SERPL-SCNC: 26 MMOL/L (ref 20–33)
CREAT SERPL-MCNC: 0.55 MG/DL (ref 0.5–1.4)
FASTING STATUS PATIENT QL REPORTED: NORMAL
GLOBULIN SER CALC-MCNC: 3.1 G/DL (ref 1.9–3.5)
GLUCOSE SERPL-MCNC: 110 MG/DL (ref 65–99)
HDLC SERPL-MCNC: 83 MG/DL
LDLC SERPL CALC-MCNC: 175 MG/DL
POTASSIUM SERPL-SCNC: 3.4 MMOL/L (ref 3.6–5.5)
PROT SERPL-MCNC: 7.4 G/DL (ref 6–8.2)
SODIUM SERPL-SCNC: 139 MMOL/L (ref 135–145)
TRIGL SERPL-MCNC: 153 MG/DL (ref 0–149)

## 2020-10-01 PROCEDURE — 80053 COMPREHEN METABOLIC PANEL: CPT

## 2020-10-01 PROCEDURE — 80061 LIPID PANEL: CPT

## 2020-10-01 PROCEDURE — 36415 COLL VENOUS BLD VENIPUNCTURE: CPT

## 2020-10-05 ENCOUNTER — OFFICE VISIT (OUTPATIENT)
Dept: URGENT CARE | Facility: PHYSICIAN GROUP | Age: 60
End: 2020-10-05
Payer: COMMERCIAL

## 2020-10-05 VITALS
HEART RATE: 82 BPM | HEIGHT: 68 IN | OXYGEN SATURATION: 100 % | TEMPERATURE: 98 F | BODY MASS INDEX: 26.67 KG/M2 | WEIGHT: 176 LBS | RESPIRATION RATE: 16 BRPM | SYSTOLIC BLOOD PRESSURE: 158 MMHG | DIASTOLIC BLOOD PRESSURE: 96 MMHG

## 2020-10-05 DIAGNOSIS — H66.001 ACUTE SUPPURATIVE OTITIS MEDIA OF RIGHT EAR WITHOUT SPONTANEOUS RUPTURE OF TYMPANIC MEMBRANE, RECURRENCE NOT SPECIFIED: Primary | ICD-10-CM

## 2020-10-05 DIAGNOSIS — H65.193 ACUTE MEE (MIDDLE EAR EFFUSION), BILATERAL: ICD-10-CM

## 2020-10-05 PROCEDURE — 99214 OFFICE O/P EST MOD 30 MIN: CPT | Performed by: PHYSICIAN ASSISTANT

## 2020-10-05 ASSESSMENT — FIBROSIS 4 INDEX: FIB4 SCORE: 5.68

## 2020-10-05 NOTE — PROGRESS NOTES
"Subjective:      Pt is a 60 y.o. female who presents with Otalgia (x1day R ear )            HPI  This is a new problem. PT comes into the [facility] with a chief complaint of RIGHT ear pain x 1 day. PT denies drainage or loss of hearing or tinnitus. PT describes pain as an \"aching\" type of pain with 4/10 on the pains scale. Pt denies CP, SOB, NVD, paresthesias, headaches, dizziness, change in vision, hives, or joint pain. Pt has not taken any RX medications for this condition. The pt's medication list, problem list, and allergies have been evaluated and reviewed during today's visit.    PMH:  Past Medical History:   Diagnosis Date   • Allergy    • Dense breast 2019   • GERD (gastroesophageal reflux disease)    • Gilbert disease    • Hyperlipidemia    • Hypertension    • MS (multiple sclerosis) (HCC)    • Muscle disorder     Multiple sclerosis       PSH:  Past Surgical History:   Procedure Laterality Date   • BREAST RECONSTRUCTION      BL reductions   • CHOLECYSTECTOMY         Fam Hx:    family history includes Breast Cancer in her sister; Cancer in her sister; Diabetes in her son; Drug abuse in her sister; GI Disease in her son; Heart Disease in her father; Heart Failure in her father; Hyperlipidemia in her mother and sister; Hypertension in her maternal grandmother, mother, and sister; Lung Disease in her father; Mult Sclerosis in her sister; No Known Problems in her brother, brother, maternal aunt, maternal grandfather, maternal uncle, paternal aunt, paternal aunt, paternal aunt, paternal grandfather, paternal grandmother, and sister; Other in her sister; Psychiatric Illness in her sister; Thyroid in her sister.  Family Status   Relation Name Status   • Mo     • Fa     • Sis Ayde    • Orlin Costa Alive   • Sis Kaylie Alive   • Sis Rosa    • Sis Marisa Alive   • Sis Dolly Alive   • Bro Christian Alive   • Sis Jaylin Alive   • MAunt  Alive   • MUnc     • PAunt     • MGMo  "    • MGFa     • PGMo     • PGFa     • PAunt     • PAunt     • Son  Alive       Soc HX:  Social History     Socioeconomic History   • Marital status:      Spouse name: Elder   • Number of children: 2   • Years of education: Not on file   • Highest education level: Not on file   Occupational History   • Not on file   Social Needs   • Financial resource strain: Not on file   • Food insecurity     Worry: Not on file     Inability: Not on file   • Transportation needs     Medical: Not on file     Non-medical: Not on file   Tobacco Use   • Smoking status: Never Smoker   • Smokeless tobacco: Never Used   Substance and Sexual Activity   • Alcohol use: Yes     Alcohol/week: 1.8 - 2.4 oz     Types: 3 - 4 Shots of liquor per week   • Drug use: No   • Sexual activity: Yes     Partners: Male   Lifestyle   • Physical activity     Days per week: Not on file     Minutes per session: Not on file   • Stress: Not on file   Relationships   • Social connections     Talks on phone: Not on file     Gets together: Not on file     Attends Druze service: Not on file     Active member of club or organization: Not on file     Attends meetings of clubs or organizations: Not on file     Relationship status: Not on file   • Intimate partner violence     Fear of current or ex partner: Not on file     Emotionally abused: Not on file     Physically abused: Not on file     Forced sexual activity: Not on file   Other Topics Concern   • Not on file   Social History Narrative   • Not on file         Medications:    Current Outpatient Medications:   •  omeprazole (PRILOSEC) 20 MG delayed-release capsule, Take 1 Cap by mouth every morning before breakfast., Disp: 90 Cap, Rfl: 3  •  lisinopril (PRINIVIL) 10 MG Tab, TAKE 1 TABLET BY MOUTH EVERY DAY, Disp: 90 Tab, Rfl: 1  •  vitamin E (VITAMIN E) 1000 UNIT Cap, Take 400 Units by mouth every day., Disp: , Rfl:   •  Cholecalciferol (VITAMIN D) 2000  "UNIT Tab, Take 2,000 Units by mouth every day., Disp: , Rfl:   •  Omega-3 Fatty Acids (FISH OIL) 1000 MG Cap capsule, Take 1,000 mg by mouth 3 times a day, with meals., Disp: , Rfl:   •  coenzyme Q-10 30 MG capsule, Take 60 mg by mouth every day., Disp: , Rfl:   •  cyanocobalamin (VITAMIN B-12) 500 MCG Tab, Take 1 Tab by mouth every day., Disp: 90 Tab, Rfl: 3  •  therapeutic multivitamin-minerals (THERAGRAN-M) Tab, Take 1 Tab by mouth every day., Disp: , Rfl:   •  fexofenadine (ALLEGRA) 60 MG Tab, Take 60 mg by mouth every day., Disp: , Rfl:       Allergies:  Atorvastatin, Doxycycline, Morphine, and Penicillins    ROS  Constitutional: Negative for fever, chills and malaise/fatigue.   HENT: Positive for RIGHT ear discomfort, ear fullness and mild hearing loss. Negative for congestion, nosebleeds, sore throat and tinnitus.    Eyes: Negative for blurred vision, double vision and photophobia.   Respiratory: Negative for cough, shortness of breath.    Cardiovascular: Negative for chest pain and palpitations.   Gastrointestinal: Negative for nausea, vomiting, abdominal pain, diarrhea and constipation.   Genitourinary: Negative for dysuria and flank pain.   Musculoskeletal: Negative for joint pain and myalgias.   Skin: Negative for itching and rash.   Neurological: Negative for dizziness, tingling, weakness and headaches.   Endo/Heme/Allergies: Does not bruise/bleed easily.   Psychiatric/Behavioral: Negative for depression. The patient is not nervous/anxious.               Objective:     /96   Pulse 82   Temp 36.7 °C (98 °F) (Temporal)   Resp 16   Ht 1.727 m (5' 8\")   Wt 79.8 kg (176 lb)   SpO2 100%   BMI 26.76 kg/m²      Physical Exam       Constitutional: PT is oriented to person, place, and time.   HENT:   Head: Normocephalic and atraumatic.   LEFT Ear: Hearing, tympanic membrane, external ear and ear canal normal. A middle ear effusion is present.   RIGHT Ear: Hearing, external ear and ear canal normal. " Tympanic membrane is erythematous and bulging. A middle ear effusion is present.   Nose: Nose normal.   Mouth/Throat: Oropharynx is clear and moist. No oropharyngeal exudate.   Eyes: Conjunctivae normal and EOM are normal. Pupils are equal, round, and reactive to light.   Neck: Normal range of motion. Neck supple. No thyromegaly present.   Cardiovascular: Normal rate, regular rhythm, normal heart sounds and intact distal pulses.  Exam reveals no gallop and no friction rub.    No murmur heard.  Pulmonary/Chest: Effort normal and breath sounds normal. No respiratory distress. PT has no wheezes. PT has no rales. PT exhibits no tenderness.   Abdominal: Soft. Bowel sounds are normal. PT exhibits no distension and no mass. There is no tenderness. There is no rebound and no guarding.   Musculoskeletal: Normal range of motion. PT exhibits no edema and no tenderness.   Neurological: PT is alert and oriented to person, place, and time. No cranial nerve deficit.   Skin: Skin is warm and dry. No rash noted. No erythema.   Psychiatric: PT has a normal mood and affect. PT behavior is normal. Judgment and thought content normal.        Assessment/Plan:        1. Acute suppurative otitis media of right ear without spontaneous rupture of tympanic membrane, recurrence not specified      2. Acute COLLIN (middle ear effusion), bilateral      Rest, fluids encouraged.  OTC decongestant for congestion  AVS with medical info given.  Pt was in full understanding and agreement with the plan.  Differential diagnosis, natural history, supportive care, and indications for immediate follow-up discussed. All questions answered. Patient agrees with the plan of care.  Follow-up as needed if symptoms worsen or fail to improve to PCP, Urgent care or Emergency Room.

## 2020-10-05 NOTE — PATIENT INSTRUCTIONS
Otitis Media, Adult    Otitis media means that the middle ear is red and swollen (inflamed) and full of fluid. The condition usually goes away on its own.  Follow these instructions at home:  · Take over-the-counter and prescription medicines only as told by your doctor.  · If you were prescribed an antibiotic medicine, take it as told by your doctor. Do not stop taking the antibiotic even if you start to feel better.  · Keep all follow-up visits as told by your doctor. This is important.  Contact a doctor if:  · You have bleeding from your nose.  · There is a lump on your neck.  · You are not getting better in 5 days.  · You feel worse instead of better.  Get help right away if:  · You have pain that is not helped with medicine.  · You have swelling, redness, or pain around your ear.  · You get a stiff neck.  · You cannot move part of your face (paralyzed).  · You notice that the bone behind your ear hurts when you touch it.  · You get a very bad headache.  Summary  · Otitis media means that the middle ear is red, swollen, and full of fluid.  · This condition usually goes away on its own. In some cases, treatment may be needed.  · If you were prescribed an antibiotic medicine, take it as told by your doctor.  This information is not intended to replace advice given to you by your health care provider. Make sure you discuss any questions you have with your health care provider.  Document Released: 06/05/2009 Document Revised: 11/30/2018 Document Reviewed: 01/08/2018  Elsevier Patient Education © 2020 [employer]

## 2020-10-06 ENCOUNTER — TELEMEDICINE (OUTPATIENT)
Dept: MEDICAL GROUP | Facility: PHYSICIAN GROUP | Age: 60
End: 2020-10-06
Payer: COMMERCIAL

## 2020-10-06 VITALS — HEIGHT: 68 IN | BODY MASS INDEX: 27.13 KG/M2 | TEMPERATURE: 97.9 F | WEIGHT: 179 LBS

## 2020-10-06 DIAGNOSIS — I10 ESSENTIAL HYPERTENSION: ICD-10-CM

## 2020-10-06 DIAGNOSIS — R79.89 ELEVATED LFTS: ICD-10-CM

## 2020-10-06 DIAGNOSIS — H66.001 NON-RECURRENT ACUTE SUPPURATIVE OTITIS MEDIA OF RIGHT EAR WITHOUT SPONTANEOUS RUPTURE OF TYMPANIC MEMBRANE: ICD-10-CM

## 2020-10-06 DIAGNOSIS — E78.2 MIXED HYPERLIPIDEMIA: ICD-10-CM

## 2020-10-06 PROBLEM — H92.09 EAR ACHE: Status: ACTIVE | Noted: 2020-10-06

## 2020-10-06 PROCEDURE — 99214 OFFICE O/P EST MOD 30 MIN: CPT | Mod: 95,CR | Performed by: NURSE PRACTITIONER

## 2020-10-06 RX ORDER — AZITHROMYCIN 250 MG/1
TABLET, FILM COATED ORAL
Qty: 6 TAB | Refills: 0 | Status: SHIPPED | OUTPATIENT
Start: 2020-10-06 | End: 2020-10-11

## 2020-10-06 RX ORDER — LISINOPRIL 20 MG/1
20 TABLET ORAL
Qty: 90 TAB | Refills: 0 | Status: SHIPPED | OUTPATIENT
Start: 2020-10-06 | End: 2020-12-31

## 2020-10-06 RX ORDER — ROSUVASTATIN CALCIUM 10 MG/1
10 TABLET, COATED ORAL EVERY EVENING
Qty: 30 TAB | Refills: 0 | Status: SHIPPED | OUTPATIENT
Start: 2020-10-06 | End: 2020-10-28

## 2020-10-06 ASSESSMENT — FIBROSIS 4 INDEX: FIB4 SCORE: 5.68

## 2020-10-06 NOTE — ASSESSMENT & PLAN NOTE
Chronic, ongoing.  She is currently taking lisinopril 10 mg/day as directed.  She is interested in increasing her dose of lisinopril from 10 to 20 mg/day as she used to take 20 mg/day.  Her blood pressure was elevated at urgent care yesterday and she is concerned that she does not have adequate control of her blood pressure on her current dose.  Additionally she recently gained weight and wonders if this is contributing to her increased blood pressure.  She does have a blood pressure cuff at home, however she does not use it very frequently.

## 2020-10-06 NOTE — ASSESSMENT & PLAN NOTE
Chronic, ongoing.  She is no longer being followed by gastroenterology.  She does report that she has been consuming wine more regularly than before.  She is interested in reducing or limiting alcohol and losing weight to help improve her LFTs.  She already avoids acetaminophen.   10/3/2019 06:22 10/3/2019 06:22 6/25/2020 10:09 10/1/2020 08:40   AST(SGOT) 30 34 151 (H) 294 (H)   ALT(SGPT) 44 52 (H) 210 (H) 291 (H)

## 2020-10-06 NOTE — ASSESSMENT & PLAN NOTE
Chronic, uncontrolled.  In June 2020 she tried atorvastatin, but only tolerated 4-5 doses before developing severe myalgias and nausea.  She stopped the medication and has not been on a lipid-lowering medication since then.  She would like to try taking a different statin.

## 2020-10-06 NOTE — ASSESSMENT & PLAN NOTE
New problem to the examiner.  Patient developed a right-sided earache yesterday and was seen at an urgent care.  She was told that she had an ear infection in her right ear and an effusion in her left ear.  She expected to receive a course of low-dose steroids for this, but never received the prescription and was unable to make contact with the provider she saw yesterday.  Her symptoms persist today.

## 2020-10-06 NOTE — PROGRESS NOTES
Virtual Visit: Established Patient   This visit was conducted via Zoom using secure and encrypted videoconferencing technology. The patient was in a private location in the state [facility].    The patient's identity was confirmed and verbal consent was obtained for this virtual visit.    Subjective:   CC:   Chief Complaint   Patient presents with   • Lab Results   • Hyperlipidemia     fv       Trang Moses is a 60 y.o. female presenting for evaluation and management of:    Mixed hyperlipidemia  Chronic, uncontrolled.  In June 2020 she tried atorvastatin, but only tolerated 4-5 doses before developing severe myalgias and nausea.  She stopped the medication and has not been on a lipid-lowering medication since then.  She would like to try taking a different statin.     Essential hypertension  Chronic, ongoing.  She is currently taking lisinopril 10 mg/day as directed.  She is interested in increasing her dose of lisinopril from 10 to 20 mg/day as she used to take 20 mg/day.  Her blood pressure was elevated at urgent care yesterday and she is concerned that she does not have adequate control of her blood pressure on her current dose.  Additionally she recently gained weight and wonders if this is contributing to her increased blood pressure.  She does have a blood pressure cuff at home, however she does not use it very frequently.    Elevated LFTs  Chronic, ongoing.  She is no longer being followed by gastroenterology.  She does report that she has been consuming wine more regularly than before.  She is interested in reducing or limiting alcohol and losing weight to help improve her LFTs.  She already avoids acetaminophen.   10/3/2019 06:22 10/3/2019 06:22 6/25/2020 10:09 10/1/2020 08:40   AST(SGOT) 30 34 151 (H) 294 (H)   ALT(SGPT) 44 52 (H) 210 (H) 291 (H)       Ear ache  New problem to the examiner.  Patient developed a right-sided earache yesterday and was seen at an urgent care.  She was told that she had an ear  infection in her right ear and an effusion in her left ear.  She expected to receive a course of low-dose steroids for this, but never received the prescription and was unable to make contact with the provider she saw yesterday.  Her symptoms persist today.    ROS   Denies any recent fevers or chills. No nausea or vomiting. No chest pains or shortness of breath.     Allergies   Allergen Reactions   • Atorvastatin Myalgia   • Doxycycline Rash     recent   • Morphine Anaphylaxis   • Penicillins Rash     .     Current medicines (including changes today)  Current Outpatient Medications   Medication Sig Dispense Refill   • rosuvastatin (CRESTOR) 10 MG Tab Take 1 Tab by mouth every evening. 30 Tab 0   • lisinopril (PRINIVIL) 20 MG Tab Take 1 Tab by mouth every day. 90 Tab 0   • azithromycin (ZITHROMAX) 250 MG Tab Take 2 Tabs by mouth every day for 1 day, THEN 1 Tab every day for 4 days. 6 Tab 0   • omeprazole (PRILOSEC) 20 MG delayed-release capsule Take 1 Cap by mouth every morning before breakfast. 90 Cap 3   • vitamin E (VITAMIN E) 1000 UNIT Cap Take 400 Units by mouth every day.     • Cholecalciferol (VITAMIN D) 2000 UNIT Tab Take 2,000 Units by mouth every day.     • Omega-3 Fatty Acids (FISH OIL) 1000 MG Cap capsule Take 1,000 mg by mouth 3 times a day, with meals.     • coenzyme Q-10 30 MG capsule Take 60 mg by mouth every day.     • cyanocobalamin (VITAMIN B-12) 500 MCG Tab Take 1 Tab by mouth every day. 90 Tab 3   • therapeutic multivitamin-minerals (THERAGRAN-M) Tab Take 1 Tab by mouth every day.       No current facility-administered medications for this visit.      Patient Active Problem List    Diagnosis Date Noted   • Ear ache 10/06/2020   • Vitamin D deficiency 06/04/2020   • Hiatal hernia 09/03/2019   • Vitamin B12 deficiency 09/03/2019   • Other insomnia 09/03/2019   • Fatty liver 09/03/2019   • Seasonal allergic rhinitis due to pollen 05/22/2019   • Chronic midline low back pain with right-sided sciatica  "05/22/2019   • Aspirin long-term use 05/22/2019   • Gastroesophageal reflux disease without esophagitis 05/22/2019   • Viral hepatitis A without hepatic coma 05/22/2019   • Elevated LFTs 05/25/2018   • Mixed hyperlipidemia 05/09/2016   • MS (multiple sclerosis) (HCC) 03/31/2016   • Essential hypertension 03/31/2016     Family History   Problem Relation Age of Onset   • Hypertension Mother    • Hyperlipidemia Mother    • Lung Disease Father         emphysema (smoking)   • Heart Disease Father    • Heart Failure Father    • Cancer Sister         breast CA   • Breast Cancer Sister    • No Known Problems Brother    • Hypertension Sister    • Hyperlipidemia Sister    • Other Sister         MS multiple sclerosis   • Mult Sclerosis Sister    • Thyroid Sister    • Psychiatric Illness Sister    • No Known Problems Sister    • No Known Problems Brother    • Drug abuse Sister    • No Known Problems Maternal Aunt    • No Known Problems Maternal Uncle    • No Known Problems Paternal Aunt    • Hypertension Maternal Grandmother    • No Known Problems Maternal Grandfather    • No Known Problems Paternal Grandmother    • No Known Problems Paternal Grandfather    • No Known Problems Paternal Aunt    • No Known Problems Paternal Aunt    • GI Disease Son         [place]   • Diabetes Son      She  has a past medical history of Allergy, Dense breast (5/22/2019), GERD (gastroesophageal reflux disease), Gilbert disease, Hyperlipidemia, Hypertension, MS (multiple sclerosis) (HCC), and Muscle disorder.  She  has a past surgical history that includes cholecystectomy and breast reconstruction.     Objective:   Temp 36.6 °C (97.9 °F) (Temporal)   Ht 1.727 m (5' 8\")   Wt 81.2 kg (179 lb)   BMI 27.22 kg/m²     Physical Exam:  Constitutional: Alert, no distress, well-groomed.  Skin: No rashes in visible areas.  Eye: Round. Conjunctiva clear, lids normal. No icterus.   ENMT: Lips pink without lesions, good dentition, moist mucous membranes. " Phonation normal.  Neck: No masses, no thyromegaly. Moves freely without pain.  Respiratory: Unlabored respiratory effort, no cough or audible wheeze  Psych: Alert and oriented x3, normal affect and mood.     Assessment and Plan:   The following treatment plan was discussed:     1. Essential hypertension  Chronic, ongoing.  Increase dose of lisinopril to 20 mg daily.  Patient instructed to take blood pressure at home daily and keep a log for review at next appointment.  - lisinopril (PRINIVIL) 20 MG Tab; Take 1 Tab by mouth every day.  Dispense: 90 Tab; Refill: 0    2. Mixed hyperlipidemia  Chronic, uncontrolled.  We will trial rosuvastatin.  Patient instructed to monitor for adverse effects and notify provider if this medication is not tolerated.  Labs due in April 2021.  - rosuvastatin (CRESTOR) 10 MG Tab; Take 1 Tab by mouth every evening.  Dispense: 30 Tab; Refill: 0  - Lipid Profile; Future    3. Elevated LFTs  Chronic, ongoing.  Patient advised to avoid liver toxic substances including Tylenol and alcohol.  Due for labs April 2021.  - Comp Metabolic Panel; Future    4. Non-recurrent acute suppurative otitis media of right ear without spontaneous rupture of tympanic membrane  New problem for the examiner.  Patient is allergic to penicillin and doxycycline, but did tolerate azithromycin in the past.  - azithromycin (ZITHROMAX) 250 MG Tab; Take 2 Tabs by mouth every day for 1 day, THEN 1 Tab every day for 4 days.  Dispense: 6 Tab; Refill: 0    Follow-up: Return in about 6 months (around 4/6/2021) for High Cholesterol, Follow up Labs.

## 2020-10-07 RX ORDER — LISINOPRIL 10 MG/1
TABLET ORAL
Qty: 90 TAB | Refills: 1 | OUTPATIENT
Start: 2020-10-07

## 2020-10-28 DIAGNOSIS — E78.2 MIXED HYPERLIPIDEMIA: ICD-10-CM

## 2020-10-28 RX ORDER — ROSUVASTATIN CALCIUM 10 MG/1
TABLET, COATED ORAL
Qty: 90 TAB | Refills: 1 | Status: SHIPPED | OUTPATIENT
Start: 2020-10-28 | End: 2021-01-26

## 2020-10-28 NOTE — TELEPHONE ENCOUNTER
Received request via: Pharmacy    Was the patient seen in the last year in this department? Yes LOV  10/06/2020    Does the patient have an active prescription (recently filled or refills available) for medication(s) requested? No

## 2020-10-28 NOTE — TELEPHONE ENCOUNTER
Requested Prescriptions     Pending Prescriptions Disp Refills   • rosuvastatin (CRESTOR) 10 MG Tab [Pharmacy Med Name: ROSUVASTATIN CALCIUM 10 MG TAB] 90 Tab 1     Sig: TAKE 1 TABLET BY MOUTH EVERY DAY IN THE EVENING       RODERICK Herring.

## 2020-11-02 DIAGNOSIS — Z80.3 FAMILY HISTORY OF BREAST CANCER IN SISTER: ICD-10-CM

## 2020-11-02 DIAGNOSIS — Z84.81 FAMILY HISTORY OF BRCA GENE POSITIVE: ICD-10-CM

## 2020-11-03 NOTE — PROGRESS NOTES
1. Family history of breast cancer in sister  2. Family history of BRCA gene positive  - REFERRAL TO GENETICS     Patient sibling recently diagnosed with breast cancer and positive for BRCA.  Patient requesting evaluation.

## 2020-12-31 DIAGNOSIS — I10 ESSENTIAL HYPERTENSION: ICD-10-CM

## 2020-12-31 RX ORDER — LISINOPRIL 20 MG/1
TABLET ORAL
Qty: 90 TAB | Refills: 0 | Status: SHIPPED | OUTPATIENT
Start: 2020-12-31 | End: 2021-03-19

## 2020-12-31 NOTE — TELEPHONE ENCOUNTER
Received request via: Pharmacy    Was the patient seen in the last year in this department? Yes - last office visit 10/6/2020    Does the patient have an active prescription (recently filled or refills available) for medication(s) requested? No

## 2021-01-01 NOTE — TELEPHONE ENCOUNTER
Requested Prescriptions     Pending Prescriptions Disp Refills   • lisinopril (PRINIVIL) 20 MG Tab [Pharmacy Med Name: LISINOPRIL 20 MG TABLET] 90 Tab 0     Sig: TAKE 1 TABLET BY MOUTH EVERY DAY       RODERICK Herring.

## 2021-01-26 DIAGNOSIS — E78.2 MIXED HYPERLIPIDEMIA: ICD-10-CM

## 2021-01-26 RX ORDER — ROSUVASTATIN CALCIUM 10 MG/1
TABLET, COATED ORAL
Qty: 90 TAB | Refills: 0 | Status: SHIPPED | OUTPATIENT
Start: 2021-01-26

## 2021-01-26 NOTE — TELEPHONE ENCOUNTER
Requested Prescriptions     Pending Prescriptions Disp Refills   • rosuvastatin (CRESTOR) 10 MG Tab [Pharmacy Med Name: ROSUVASTATIN CALCIUM 10 MG TAB] 90 Tab 0     Sig: TAKE 1 TABLET BY MOUTH EVERY DAY IN THE EVENING       RODERICK Herring.

## 2021-03-15 DIAGNOSIS — Z23 NEED FOR VACCINATION: ICD-10-CM

## 2023-11-08 NOTE — ASSESSMENT & PLAN NOTE
"New problem to examiner.  Chronic problem for the patient that is ongoing.  Patient reports symptoms are well controlled with omeprazole 20 mg/day.  She does follow with gastroenterology, states that she has a hiatal hernia as well.  Reports that her EGD has been completed in the past and is \"okay\".  Denies nausea, vomiting, abdominal pain, trouble swallowing, bloody stools.  " (E4) spontaneous